# Patient Record
Sex: FEMALE | Race: WHITE | Employment: FULL TIME | URBAN - METROPOLITAN AREA
[De-identification: names, ages, dates, MRNs, and addresses within clinical notes are randomized per-mention and may not be internally consistent; named-entity substitution may affect disease eponyms.]

---

## 2017-06-05 ENCOUNTER — OFFICE VISIT (OUTPATIENT)
Dept: OBGYN | Age: 32
End: 2017-06-05
Payer: COMMERCIAL

## 2017-06-05 ENCOUNTER — HOSPITAL ENCOUNTER (OUTPATIENT)
Age: 32
Setting detail: SPECIMEN
Discharge: HOME | End: 2017-06-05
Payer: COMMERCIAL

## 2017-06-05 VITALS
HEIGHT: 69 IN | HEART RATE: 66 BPM | DIASTOLIC BLOOD PRESSURE: 70 MMHG | RESPIRATION RATE: 18 BRPM | BODY MASS INDEX: 35.47 KG/M2 | WEIGHT: 239.5 LBS | SYSTOLIC BLOOD PRESSURE: 117 MMHG | TEMPERATURE: 97.9 F

## 2017-06-05 DIAGNOSIS — N87.0 DYSPLASIA OF CERVIX, LOW GRADE (CIN 1): Primary | ICD-10-CM

## 2017-06-05 DIAGNOSIS — R87.810 ASCUS WITH POSITIVE HIGH RISK HPV CERVICAL: ICD-10-CM

## 2017-06-05 DIAGNOSIS — R87.610 ASCUS WITH POSITIVE HIGH RISK HPV CERVICAL: ICD-10-CM

## 2017-06-05 LAB
CONTROL: NORMAL
PREGNANCY TEST URINE, POC: NEGATIVE

## 2017-06-05 PROCEDURE — 81025 URINE PREGNANCY TEST: CPT | Performed by: OBSTETRICS & GYNECOLOGY

## 2017-06-05 PROCEDURE — 57454 BX/CURETT OF CERVIX W/SCOPE: CPT | Performed by: OBSTETRICS & GYNECOLOGY

## 2017-06-08 LAB — SURGICAL PATHOLOGY REPORT: NORMAL

## 2017-06-19 ENCOUNTER — HOSPITAL ENCOUNTER (OUTPATIENT)
Age: 32
Setting detail: SPECIMEN
Discharge: HOME | End: 2017-06-19
Payer: COMMERCIAL

## 2017-06-19 ENCOUNTER — OFFICE VISIT (OUTPATIENT)
Dept: OBGYN | Age: 32
End: 2017-06-19
Payer: COMMERCIAL

## 2017-06-19 VITALS
WEIGHT: 240.3 LBS | DIASTOLIC BLOOD PRESSURE: 88 MMHG | HEART RATE: 59 BPM | TEMPERATURE: 98.5 F | RESPIRATION RATE: 18 BRPM | SYSTOLIC BLOOD PRESSURE: 130 MMHG | BODY MASS INDEX: 35.59 KG/M2 | HEIGHT: 69 IN

## 2017-06-19 DIAGNOSIS — Z01.419 WELL WOMAN EXAM WITH ROUTINE GYNECOLOGICAL EXAM: Primary | ICD-10-CM

## 2017-06-19 DIAGNOSIS — R87.810 ASCUS WITH POSITIVE HIGH RISK HPV CERVICAL: ICD-10-CM

## 2017-06-19 DIAGNOSIS — R87.610 ASCUS WITH POSITIVE HIGH RISK HPV CERVICAL: ICD-10-CM

## 2017-06-19 PROCEDURE — 99395 PREV VISIT EST AGE 18-39: CPT | Performed by: OBSTETRICS & GYNECOLOGY

## 2017-06-19 RX ORDER — NORGESTIMATE AND ETHINYL ESTRADIOL 7DAYSX3 28
1 KIT ORAL DAILY
Qty: 28 TABLET | Refills: 3 | Status: SHIPPED | OUTPATIENT
Start: 2017-06-19 | End: 2017-12-28 | Stop reason: ALTCHOICE

## 2017-06-19 ASSESSMENT — PATIENT HEALTH QUESTIONNAIRE - PHQ9
SUM OF ALL RESPONSES TO PHQ9 QUESTIONS 1 & 2: 0
1. LITTLE INTEREST OR PLEASURE IN DOING THINGS: 0
2. FEELING DOWN, DEPRESSED OR HOPELESS: 0
SUM OF ALL RESPONSES TO PHQ QUESTIONS 1-9: 0

## 2017-06-20 LAB
CYTOLOGY REPORT: NORMAL
HPV SAMPLE: ABNORMAL
HPV SOURCE: ABNORMAL
HPV, GENOTYPE 16: NOT DETECTED
HPV, GENOTYPE 18: NOT DETECTED
HPV, HIGH RISK OTHER: DETECTED
HPV, INTERPRETATION: ABNORMAL

## 2017-11-09 ENCOUNTER — TELEPHONE (OUTPATIENT)
Dept: ADMINISTRATIVE | Age: 32
End: 2017-11-09

## 2017-11-14 ENCOUNTER — OFFICE VISIT (OUTPATIENT)
Dept: OBGYN | Age: 32
End: 2017-11-14
Payer: COMMERCIAL

## 2017-11-14 VITALS
BODY MASS INDEX: 35.88 KG/M2 | WEIGHT: 243 LBS | SYSTOLIC BLOOD PRESSURE: 123 MMHG | HEART RATE: 48 BPM | DIASTOLIC BLOOD PRESSURE: 75 MMHG

## 2017-11-14 DIAGNOSIS — N87.0 CIN I (CERVICAL INTRAEPITHELIAL NEOPLASIA I): Primary | ICD-10-CM

## 2017-11-14 PROCEDURE — 1036F TOBACCO NON-USER: CPT | Performed by: OBSTETRICS & GYNECOLOGY

## 2017-11-14 PROCEDURE — G8417 CALC BMI ABV UP PARAM F/U: HCPCS | Performed by: OBSTETRICS & GYNECOLOGY

## 2017-11-14 PROCEDURE — G8427 DOCREV CUR MEDS BY ELIG CLIN: HCPCS | Performed by: OBSTETRICS & GYNECOLOGY

## 2017-11-14 PROCEDURE — G8484 FLU IMMUNIZE NO ADMIN: HCPCS | Performed by: OBSTETRICS & GYNECOLOGY

## 2017-11-14 PROCEDURE — 99213 OFFICE O/P EST LOW 20 MIN: CPT | Performed by: OBSTETRICS & GYNECOLOGY

## 2017-11-14 NOTE — PROGRESS NOTES
Sammi Charles  2017      Sammi Charles is a 34 y.o. female     The patient was seen today. She was here to follow-up regarding her labs and diagnostics ordered at her last visit for the diagnosis of:    ICD-10-CM ICD-9-CM    1. KRANTHI I (cervical intraepithelial neoplasia I) N87.0 622.11        Patient had a colposcopy performed in 2017 that revealed KRANTHI 1. Patient has a significant history of LGSIL that has been persistent for greater than 24 months. Patient was previously undecided if she wishes to proceed with recommended excision procedure to elect for expectant management with follow up pap smear with cotesting in 12 months. Management options again discussed with patient, risks/benefits/alternatives to excision procedure reveiwed with patient. Patient states she would like to proceed with LEEP. She does not have any specific chief complaint today. Her bowels are regular and she is voiding without difficulty.        Past Medical History:   Diagnosis Date    Abnormal Pap smear     HSIL    Bartholin cyst 2007 word catheter    HSV (herpes simplex virus) infection 2013         Past Surgical History:   Procedure Laterality Date    BUNIONECTOMY           Family History   Problem Relation Age of Onset    Breast Cancer Maternal Grandmother 54    Cancer Neg Hx     Colon Cancer Neg Hx     Diabetes Neg Hx     Eclampsia Neg Hx     Hypertension Neg Hx     Ovarian Cancer Neg Hx      Labor Neg Hx     Spont Abortions Neg Hx     Stroke Neg Hx          Social History   Substance Use Topics    Smoking status: Never Smoker    Smokeless tobacco: Never Used    Alcohol use Yes      Comment: once weekly         MEDICATIONS:  Current Outpatient Prescriptions   Medication Sig Dispense Refill    Norgestim-Eth Estrad Triphasic (ORTHO TRI-CYCLEN, 28,) 0.18/0.215/0.25 MG-35 MCG TABS Take 1 tablet by mouth daily 28 tablet 3    lidocaine (XYLOCAINE) 5 % ointment Apply present. Descriptive Diagnosis:      Atypical squamous cells of undetermined significance (ASC-US). Comments:      High Risk HPV testing was ordered. Cytotechnologist:   SANDRO Cardoza M.D.  **Electronically Signed Out**         sls/6/20/2017          Procedure/Addendum  HPV Procedure Report     Date Ordered:     6/20/2017     Status:  Signed Out      Date Complete:     6/20/2017     By: Verner Adu, CT(ASCP)      Date Reported:     6/20/2017       INTERPRETATION  Roche HPV DNA High Risk                                                      HPV Sample               Thin Prep                    (Ref Range)  HPV Type 16               Not Detected                    (Not  Detected)  HPV Type 18               Not Detected                    (Not  Detected)  Other High Risk HPV     Detected                    (Not Detected)      This test amplifies and detects DNA of 14 high-risk HPV types  associated with cervical cancer and its precursor lesions (HPV types  16, 18, 31, 33, 35, 39, 45, 51, 52, 56, 58, 59, 66, and 68). Sensitivity may be affected by specimen collection methods, stage of  infection, and the presence of interfe ring substances. Results should  be interpreted in conjunction with other available laboratory and  clinical data. A negative high-risk HPV result does not exclude the  possibility of future cytologic HSIL or underlying CIN2-3 or cancer. This test is intended for medical purposes only and is not valid for  the evaluation of suspected sexual abuse or for other forensic  purposes. Assessment:  1.  KRANTHI I (cervical intraepithelial neoplasia I)       Chief Complaint   Patient presents with    Abnormal Pap Smear       Patient Active Problem List    Diagnosis Date Noted    Dysplasia of cervix, low grade (KRANTHI 1) 06/01/2015     Priority: High     Co-testing recommended in 1 year per ASCCP guidelines (due 5/2016)      Family planning 01/19/2015     Priority: Medium

## 2017-12-11 ENCOUNTER — OFFICE VISIT (OUTPATIENT)
Dept: OBGYN | Age: 32
End: 2017-12-11
Payer: COMMERCIAL

## 2017-12-11 VITALS
SYSTOLIC BLOOD PRESSURE: 117 MMHG | DIASTOLIC BLOOD PRESSURE: 72 MMHG | BODY MASS INDEX: 36.07 KG/M2 | WEIGHT: 243.5 LBS | TEMPERATURE: 98.9 F | RESPIRATION RATE: 18 BRPM | HEART RATE: 63 BPM | HEIGHT: 69 IN

## 2017-12-11 DIAGNOSIS — Z01.818 PREOP EXAMINATION: Primary | ICD-10-CM

## 2017-12-11 DIAGNOSIS — N87.0 DYSPLASIA OF CERVIX, LOW GRADE (CIN 1): ICD-10-CM

## 2017-12-11 PROCEDURE — 99213 OFFICE O/P EST LOW 20 MIN: CPT | Performed by: OBSTETRICS & GYNECOLOGY

## 2017-12-11 PROCEDURE — G8427 DOCREV CUR MEDS BY ELIG CLIN: HCPCS | Performed by: OBSTETRICS & GYNECOLOGY

## 2017-12-11 PROCEDURE — G8417 CALC BMI ABV UP PARAM F/U: HCPCS | Performed by: OBSTETRICS & GYNECOLOGY

## 2017-12-11 PROCEDURE — 1036F TOBACCO NON-USER: CPT | Performed by: OBSTETRICS & GYNECOLOGY

## 2017-12-11 PROCEDURE — G8484 FLU IMMUNIZE NO ADMIN: HCPCS | Performed by: OBSTETRICS & GYNECOLOGY

## 2017-12-11 RX ORDER — SULFAMETHOXAZOLE AND TRIMETHOPRIM 800; 160 MG/1; MG/1
TABLET ORAL
COMMUNITY
Start: 2017-12-07 | End: 2017-12-28 | Stop reason: ALTCHOICE

## 2017-12-11 RX ORDER — CEPHALEXIN 500 MG/1
500 CAPSULE ORAL
COMMUNITY
Start: 2017-12-07 | End: 2017-12-17

## 2017-12-11 NOTE — PROGRESS NOTES
Anderson Santo  1985  Primary Care Physician: . None (Inactive)    HISTORY & PHYSICAL      2017       HOSPITAL: [facility]     HPI: Anderson Santo is a 34 y.o. female [id number] who presents for her pre-operative examination for a LEEP. Patient had a colposcopy performed in 2017 that revealed KRANTHI 1. Patient has a significant history of LGSIL that has been persistent for greater than 24 months and she would like to proceed with an excisional procedure. Management options again discussed with patient, risks/benefits/alternatives to an excisional procedure reveiwed with patient. Patient was counseled on risk for  delivery and incompetent cervix with pregnancies following a LEEP. Patient states she has completed her child bearing. All questions were answered and informed consent was obtained and signed. 1. Preop examination    2. KRANTHI 1       Patient Active Problem List   Diagnosis    HSV (herpes simplex virus) infection    Family planning    ASCUS with positive high risk HPV cervical    KRANTHI 1       Obstetric History       T1      L1     SAB0   TAB0   Ectopic0   Molar0   Multiple0   Live Births0       # Outcome Date GA Lbr Fortino/2nd Weight Sex Delivery Anes PTL Lv   2 SAB            1 Term                 Past Medical History:   Diagnosis Date    Abnormal Pap smear     HSIL    Bartholin cyst , 2007 word catheter    HSV (herpes simplex virus) infection 2013       Past Surgical History:   Procedure Laterality Date    BUNIONECTOMY         Social History     Social History    Marital status:      Spouse name: N/A    Number of children: N/A    Years of education: N/A     Occupational History    Not on file.      Social History Main Topics    Smoking status: Never Smoker    Smokeless tobacco: Never Used    Alcohol use Yes      Comment: once weekly    Drug use: No    Sexual activity: Yes     Partners: Male     Other Topics Concern Cytotechnologist:   SANDRO Perez M.D.  **Electronically Signed Out**         sls/6/20/2017          Procedure/Addendum  HPV Procedure Report     Date Ordered:     6/20/2017     Status:  Signed Out      Date Complete:     6/20/2017     By: WERNER Viera(ASCP)      Date Reported:     6/20/2017       INTERPRETATION  Roche HPV DNA High Risk                                                      HPV Sample               Thin Prep                    (Ref Range)  HPV Type 16               Not Detected                    (Not  Detected)  HPV Type 18               Not Detected                    (Not  Detected)  Other High Risk HPV     Detected                    (Not Detected)      This test amplifies and detects DNA of 14 high-risk HPV types  associated with cervical cancer and its precursor lesions (HPV types  16, 18, 31, 33, 35, 39, 45, 51, 52, 56, 58, 59, 66, and 68). Sensitivity may be affected by specimen collection methods, stage of  infection, and the presence of interfe ring substances. Results should  be interpreted in conjunction with other available laboratory and  clinical data. A negative high-risk HPV result does not exclude the  possibility of future cytologic HSIL or underlying CIN2-3 or cancer. This test is intended for medical purposes only and is not valid for  the evaluation of suspected sexual abuse or for other forensic  purposes. ASSESSMENT:    1. Preop examination     2. KRANTHI 1         Patient Active Problem List    Diagnosis Date Noted    KRANTHI 1 12/11/2017     Priority: High     Patient to be scheduled for LEEP       ASCUS with positive high risk HPV cervical 06/14/2016     Priority: High     6/5/17: Colposcopy performed.  ECC negative, 12 o'clock biopsy KRANTHI 1  Pap smear collceted 6/19/17 - will contact patient with results (previously counseled on LEEP secondary to persistent KRANTHI 1 for 24 months vs repeat pap in 12 months - patient undecided at this time)  Family planning 01/19/2015     Priority: Medium     1/19/15:  Ortho Tricyclin Low prescription given. Pt previously was on and worked well for her. Pt denies any use of tobacco products, no history of migraines or DVT. 12/11/17: Not currently taking.  HSV (herpes simplex virus) infection 02/11/2013     Priority: Low     No recent outbreak. No medication needed. Plan: Will proceed with LEEP  Informed consent obtained and signed   Will scheduled patient for Pre-admission testing  Plan for 2 week post operative follow up         Counseling: The patient was counseled on all options both medical and surgical, conservative as well as definitive. She has elected to proceed with the procedure as stated above. The patient was counseled on the procedure. Risks and complications were reviewed in detail. The patients orders, labs, consents have been completed. The history and physical as well as all supporting surgical documentation will be forwarded to the pre-operative holding area. The patient is aware that this procedure may not alleviate her symptoms. That there may be a necessity for a second surgery and that there may be an incomplete removal of abnormal tissue. The patients that are undergoing a procedure for family planning WERE INSTRUCTED THAT THE PROCEDURE IS PERMANENT AND NON REVERSIBLE. FAILURE RATES OF 18-20/1000 CASES WERE REVIEWED AS WELL AS ALL ALTERNATE REVERSIBLE FORMS OF BIRTH CONTROL MALE AND FEMALE. THEY WERE COUNSELED THAT A FAILURE WOULD MOST LIKELY RESULT IN AN ECTOPIC PREGNANCY, A PREGNANCY OUTSIDE OF THE UTERUS MOST COMMONLY IN THE FALLOPIAN TUBE, NECESSITATING FURTHER SURGERY, A BLOOD TRANSFUSION OR BOTH. POST TUBAL STERILIZATION SYNDROME WAS ALSO DISCUSSED WITH THE PATIENT AT LENGTH.      ________________________________________D.O./M.D. Date:___________  Bita Godinez DO        Patient was seen with total face to face time of 15 minutes.  More than 50% of this visit was on counseling and education regarding her   1. Preop examination     2. KRANTHI 1      and her options. She was also counseled on her preventative health maintenance recommendations and follow-up.

## 2017-12-13 ENCOUNTER — TELEPHONE (OUTPATIENT)
Dept: OBGYN | Age: 32
End: 2017-12-13

## 2017-12-13 DIAGNOSIS — Z01.818 PREOP TESTING: Primary | ICD-10-CM

## 2017-12-13 NOTE — TELEPHONE ENCOUNTER
12/13/17 Dr. Viji Dillard, pt surgery has been scheduled for 1/15/18 @ 7:30am and PAT testing on 12/28/17 @8:30am pt has been made aware of scheduled surgery/PAT pt verbalized understanding.

## 2017-12-28 ENCOUNTER — HOSPITAL ENCOUNTER (OUTPATIENT)
Dept: PREADMISSION TESTING | Age: 32
Discharge: HOME | End: 2017-12-28
Payer: COMMERCIAL

## 2017-12-28 VITALS
HEART RATE: 64 BPM | TEMPERATURE: 98 F | OXYGEN SATURATION: 98 % | BODY MASS INDEX: 36.57 KG/M2 | RESPIRATION RATE: 16 BRPM | DIASTOLIC BLOOD PRESSURE: 84 MMHG | WEIGHT: 246.91 LBS | SYSTOLIC BLOOD PRESSURE: 133 MMHG | HEIGHT: 69 IN

## 2017-12-28 RX ORDER — SODIUM CHLORIDE, SODIUM LACTATE, POTASSIUM CHLORIDE, CALCIUM CHLORIDE 600; 310; 30; 20 MG/100ML; MG/100ML; MG/100ML; MG/100ML
1000 INJECTION, SOLUTION INTRAVENOUS CONTINUOUS
Status: CANCELLED | OUTPATIENT
Start: 2017-12-28

## 2018-01-12 ENCOUNTER — ANESTHESIA EVENT (OUTPATIENT)
Dept: OPERATING ROOM | Age: 33
End: 2018-01-12
Payer: COMMERCIAL

## 2018-01-15 ENCOUNTER — HOSPITAL ENCOUNTER (OUTPATIENT)
Age: 33
Setting detail: OUTPATIENT SURGERY
Discharge: HOME | End: 2018-01-15
Attending: OBSTETRICS & GYNECOLOGY | Admitting: OBSTETRICS & GYNECOLOGY
Payer: COMMERCIAL

## 2018-01-15 ENCOUNTER — ANESTHESIA (OUTPATIENT)
Dept: OPERATING ROOM | Age: 33
End: 2018-01-15
Payer: COMMERCIAL

## 2018-01-15 VITALS
HEIGHT: 69 IN | OXYGEN SATURATION: 100 % | SYSTOLIC BLOOD PRESSURE: 115 MMHG | RESPIRATION RATE: 16 BRPM | BODY MASS INDEX: 36.43 KG/M2 | DIASTOLIC BLOOD PRESSURE: 75 MMHG | HEART RATE: 51 BPM | WEIGHT: 246 LBS | TEMPERATURE: 97.2 F

## 2018-01-15 VITALS
OXYGEN SATURATION: 99 % | RESPIRATION RATE: 17 BRPM | DIASTOLIC BLOOD PRESSURE: 36 MMHG | TEMPERATURE: 96 F | SYSTOLIC BLOOD PRESSURE: 89 MMHG

## 2018-01-15 PROBLEM — Z98.890 S/P LEEP: Status: ACTIVE | Noted: 2018-01-15

## 2018-01-15 LAB — HCG, PREGNANCY URINE (POC): NEGATIVE

## 2018-01-15 PROCEDURE — 3700000001 HC ADD 15 MINUTES (ANESTHESIA): Performed by: OBSTETRICS & GYNECOLOGY

## 2018-01-15 PROCEDURE — 88305 TISSUE EXAM BY PATHOLOGIST: CPT

## 2018-01-15 PROCEDURE — 84703 CHORIONIC GONADOTROPIN ASSAY: CPT

## 2018-01-15 PROCEDURE — 6360000002 HC RX W HCPCS: Performed by: SPECIALIST

## 2018-01-15 PROCEDURE — 7100000000 HC PACU RECOVERY - FIRST 15 MIN: Performed by: OBSTETRICS & GYNECOLOGY

## 2018-01-15 PROCEDURE — 88307 TISSUE EXAM BY PATHOLOGIST: CPT

## 2018-01-15 PROCEDURE — 2580000003 HC RX 258: Performed by: OBSTETRICS & GYNECOLOGY

## 2018-01-15 PROCEDURE — 7100000041 HC SPAR PHASE II RECOVERY - ADDTL 15 MIN: Performed by: OBSTETRICS & GYNECOLOGY

## 2018-01-15 PROCEDURE — 3700000000 HC ANESTHESIA ATTENDED CARE: Performed by: OBSTETRICS & GYNECOLOGY

## 2018-01-15 PROCEDURE — 6370000000 HC RX 637 (ALT 250 FOR IP)

## 2018-01-15 PROCEDURE — 2580000003 HC RX 258: Performed by: ANESTHESIOLOGY

## 2018-01-15 PROCEDURE — 7100000040 HC SPAR PHASE II RECOVERY - FIRST 15 MIN: Performed by: OBSTETRICS & GYNECOLOGY

## 2018-01-15 PROCEDURE — 7100000001 HC PACU RECOVERY - ADDTL 15 MIN: Performed by: OBSTETRICS & GYNECOLOGY

## 2018-01-15 PROCEDURE — 2500000003 HC RX 250 WO HCPCS: Performed by: OBSTETRICS & GYNECOLOGY

## 2018-01-15 PROCEDURE — 6370000000 HC RX 637 (ALT 250 FOR IP): Performed by: OBSTETRICS & GYNECOLOGY

## 2018-01-15 PROCEDURE — 2500000003 HC RX 250 WO HCPCS: Performed by: SPECIALIST

## 2018-01-15 PROCEDURE — 3600000003 HC SURGERY LEVEL 3 BASE: Performed by: OBSTETRICS & GYNECOLOGY

## 2018-01-15 PROCEDURE — 3600000013 HC SURGERY LEVEL 3 ADDTL 15MIN: Performed by: OBSTETRICS & GYNECOLOGY

## 2018-01-15 PROCEDURE — 57522 CONIZATION OF CERVIX: CPT | Performed by: OBSTETRICS & GYNECOLOGY

## 2018-01-15 RX ORDER — IBUPROFEN 800 MG/1
800 TABLET ORAL EVERY 8 HOURS PRN
Qty: 30 TABLET | Refills: 0 | Status: SHIPPED | OUTPATIENT
Start: 2018-01-15 | End: 2022-02-01

## 2018-01-15 RX ORDER — ULTRASOUND COUPLING MEDIUM
GEL (GRAM) TOPICAL PRN
Status: DISCONTINUED | OUTPATIENT
Start: 2018-01-15 | End: 2018-01-15 | Stop reason: HOSPADM

## 2018-01-15 RX ORDER — ONDANSETRON 2 MG/ML
INJECTION INTRAMUSCULAR; INTRAVENOUS PRN
Status: DISCONTINUED | OUTPATIENT
Start: 2018-01-15 | End: 2018-01-15 | Stop reason: SDUPTHER

## 2018-01-15 RX ORDER — LABETALOL HYDROCHLORIDE 5 MG/ML
5 INJECTION, SOLUTION INTRAVENOUS EVERY 10 MIN PRN
Status: DISCONTINUED | OUTPATIENT
Start: 2018-01-15 | End: 2018-01-15 | Stop reason: HOSPADM

## 2018-01-15 RX ORDER — SODIUM CHLORIDE, SODIUM LACTATE, POTASSIUM CHLORIDE, CALCIUM CHLORIDE 600; 310; 30; 20 MG/100ML; MG/100ML; MG/100ML; MG/100ML
INJECTION, SOLUTION INTRAVENOUS CONTINUOUS
Status: DISCONTINUED | OUTPATIENT
Start: 2018-01-15 | End: 2018-01-15 | Stop reason: HOSPADM

## 2018-01-15 RX ORDER — MEPERIDINE HYDROCHLORIDE 50 MG/ML
12.5 INJECTION INTRAMUSCULAR; INTRAVENOUS; SUBCUTANEOUS EVERY 5 MIN PRN
Status: DISCONTINUED | OUTPATIENT
Start: 2018-01-15 | End: 2018-01-15 | Stop reason: HOSPADM

## 2018-01-15 RX ORDER — LIDOCAINE HYDROCHLORIDE 10 MG/ML
INJECTION, SOLUTION EPIDURAL; INFILTRATION; INTRACAUDAL; PERINEURAL PRN
Status: DISCONTINUED | OUTPATIENT
Start: 2018-01-15 | End: 2018-01-15 | Stop reason: SDUPTHER

## 2018-01-15 RX ORDER — ONDANSETRON 2 MG/ML
4 INJECTION INTRAMUSCULAR; INTRAVENOUS
Status: DISCONTINUED | OUTPATIENT
Start: 2018-01-15 | End: 2018-01-15 | Stop reason: HOSPADM

## 2018-01-15 RX ORDER — FENTANYL CITRATE 50 UG/ML
INJECTION, SOLUTION INTRAMUSCULAR; INTRAVENOUS PRN
Status: DISCONTINUED | OUTPATIENT
Start: 2018-01-15 | End: 2018-01-15 | Stop reason: SDUPTHER

## 2018-01-15 RX ORDER — PYRANTEL PAMOATE 100 %
POWDER (GRAM) MISCELLANEOUS PRN
Status: DISCONTINUED | OUTPATIENT
Start: 2018-01-15 | End: 2018-01-15 | Stop reason: HOSPADM

## 2018-01-15 RX ORDER — MAGNESIUM HYDROXIDE 1200 MG/15ML
LIQUID ORAL CONTINUOUS PRN
Status: DISCONTINUED | OUTPATIENT
Start: 2018-01-15 | End: 2018-01-15 | Stop reason: HOSPADM

## 2018-01-15 RX ORDER — DIPHENHYDRAMINE HYDROCHLORIDE 50 MG/ML
12.5 INJECTION INTRAMUSCULAR; INTRAVENOUS
Status: DISCONTINUED | OUTPATIENT
Start: 2018-01-15 | End: 2018-01-15 | Stop reason: HOSPADM

## 2018-01-15 RX ORDER — SODIUM CHLORIDE, SODIUM LACTATE, POTASSIUM CHLORIDE, CALCIUM CHLORIDE 600; 310; 30; 20 MG/100ML; MG/100ML; MG/100ML; MG/100ML
1000 INJECTION, SOLUTION INTRAVENOUS CONTINUOUS
Status: DISCONTINUED | OUTPATIENT
Start: 2018-01-15 | End: 2018-01-15

## 2018-01-15 RX ORDER — OXYCODONE HYDROCHLORIDE AND ACETAMINOPHEN 5; 325 MG/1; MG/1
1 TABLET ORAL PRN
Status: DISCONTINUED | OUTPATIENT
Start: 2018-01-15 | End: 2018-01-15 | Stop reason: HOSPADM

## 2018-01-15 RX ORDER — OXYCODONE HYDROCHLORIDE AND ACETAMINOPHEN 5; 325 MG/1; MG/1
2 TABLET ORAL PRN
Status: DISCONTINUED | OUTPATIENT
Start: 2018-01-15 | End: 2018-01-15 | Stop reason: HOSPADM

## 2018-01-15 RX ORDER — FERRIC SUBSULFATE 0.21 G/G
LIQUID TOPICAL PRN
Status: DISCONTINUED | OUTPATIENT
Start: 2018-01-15 | End: 2018-01-15 | Stop reason: HOSPADM

## 2018-01-15 RX ORDER — PROPOFOL 10 MG/ML
INJECTION, EMULSION INTRAVENOUS PRN
Status: DISCONTINUED | OUTPATIENT
Start: 2018-01-15 | End: 2018-01-15 | Stop reason: SDUPTHER

## 2018-01-15 RX ORDER — FENTANYL CITRATE 50 UG/ML
25 INJECTION, SOLUTION INTRAMUSCULAR; INTRAVENOUS EVERY 5 MIN PRN
Status: DISCONTINUED | OUTPATIENT
Start: 2018-01-15 | End: 2018-01-15 | Stop reason: HOSPADM

## 2018-01-15 RX ORDER — HYDRALAZINE HYDROCHLORIDE 20 MG/ML
5 INJECTION INTRAMUSCULAR; INTRAVENOUS EVERY 10 MIN PRN
Status: DISCONTINUED | OUTPATIENT
Start: 2018-01-15 | End: 2018-01-15 | Stop reason: HOSPADM

## 2018-01-15 RX ORDER — FENTANYL CITRATE 50 UG/ML
50 INJECTION, SOLUTION INTRAMUSCULAR; INTRAVENOUS EVERY 5 MIN PRN
Status: DISCONTINUED | OUTPATIENT
Start: 2018-01-15 | End: 2018-01-15 | Stop reason: HOSPADM

## 2018-01-15 RX ORDER — MORPHINE SULFATE 2 MG/ML
2 INJECTION, SOLUTION INTRAMUSCULAR; INTRAVENOUS EVERY 5 MIN PRN
Status: DISCONTINUED | OUTPATIENT
Start: 2018-01-15 | End: 2018-01-15 | Stop reason: HOSPADM

## 2018-01-15 RX ORDER — LIDOCAINE HYDROCHLORIDE AND EPINEPHRINE 20; 5 MG/ML; UG/ML
INJECTION, SOLUTION EPIDURAL; INFILTRATION; INTRACAUDAL; PERINEURAL PRN
Status: DISCONTINUED | OUTPATIENT
Start: 2018-01-15 | End: 2018-01-15 | Stop reason: HOSPADM

## 2018-01-15 RX ADMIN — PROPOFOL 200 MG: 10 INJECTION, EMULSION INTRAVENOUS at 07:45

## 2018-01-15 RX ADMIN — SODIUM CHLORIDE, POTASSIUM CHLORIDE, SODIUM LACTATE AND CALCIUM CHLORIDE: 600; 310; 30; 20 INJECTION, SOLUTION INTRAVENOUS at 06:35

## 2018-01-15 RX ADMIN — LIDOCAINE HYDROCHLORIDE 50 MG: 10 INJECTION, SOLUTION EPIDURAL; INFILTRATION; INTRACAUDAL; PERINEURAL at 07:45

## 2018-01-15 RX ADMIN — FENTANYL CITRATE 25 MCG: 50 INJECTION INTRAMUSCULAR; INTRAVENOUS at 07:45

## 2018-01-15 RX ADMIN — ONDANSETRON 4 MG: 2 INJECTION INTRAMUSCULAR; INTRAVENOUS at 07:55

## 2018-01-15 ASSESSMENT — PAIN SCALES - GENERAL
PAINLEVEL_OUTOF10: 0
PAINLEVEL_OUTOF10: 0
PAINLEVEL_OUTOF10: 2
PAINLEVEL_OUTOF10: 0
PAINLEVEL_OUTOF10: 1
PAINLEVEL_OUTOF10: 1

## 2018-01-15 ASSESSMENT — PULMONARY FUNCTION TESTS
PIF_VALUE: 11
PIF_VALUE: 18
PIF_VALUE: 11
PIF_VALUE: 1
PIF_VALUE: 11
PIF_VALUE: 11
PIF_VALUE: 18
PIF_VALUE: 11
PIF_VALUE: 11
PIF_VALUE: 1
PIF_VALUE: 11
PIF_VALUE: 19
PIF_VALUE: 11
PIF_VALUE: 19
PIF_VALUE: 10
PIF_VALUE: 11
PIF_VALUE: 1
PIF_VALUE: 11
PIF_VALUE: 22
PIF_VALUE: 11
PIF_VALUE: 11
PIF_VALUE: 13
PIF_VALUE: 11
PIF_VALUE: 6
PIF_VALUE: 11
PIF_VALUE: 17
PIF_VALUE: 11
PIF_VALUE: 13
PIF_VALUE: 11
PIF_VALUE: 11
PIF_VALUE: 3
PIF_VALUE: 10
PIF_VALUE: 11
PIF_VALUE: 13
PIF_VALUE: 11
PIF_VALUE: 11
PIF_VALUE: 10
PIF_VALUE: 1
PIF_VALUE: 10
PIF_VALUE: 5
PIF_VALUE: 11
PIF_VALUE: 11

## 2018-01-15 ASSESSMENT — PAIN DESCRIPTION - PAIN TYPE: TYPE: ACUTE PAIN

## 2018-01-15 ASSESSMENT — PAIN DESCRIPTION - LOCATION: LOCATION: HEAD

## 2018-01-15 ASSESSMENT — PAIN - FUNCTIONAL ASSESSMENT: PAIN_FUNCTIONAL_ASSESSMENT: 0-10

## 2018-01-15 NOTE — OP NOTE
Operative Note  Department of Obstetrics and Gynecology  [address] [facility]       Patient: Faina Rapp   : 1985  MRN: [id number]       Acct: [de-identified]   PCP: . None (Inactive)  Date of Procedure: 1/15/18    Pre-operative Diagnosis:  28 y.o. female   KRANTHI 1 present for more than 24 months    Post-operative Diagnosis:   28 y.o. female   KRANTHI 1 present for more than 24 months    Procedure: LEEP with endocervical curettings    Surgeon: Dr. Jayla Treviño MD  Assistants: Aman Durand DO; Salome Aparicio MD    Anesthesia: general    Indications: KRANTHI I present for more than 24 months (patient preferred surgical management rather than conservative management at this time)    Procedure Details: The patient was seen in the pre-op room. The risks, benefits, complications, treatment options, and expected outcomes were discussed with the patient. The patient concurred with the proposed plan, giving informed consent. The patient was taken to the Operating Room and identified as Faina Rapp and the procedure was verified. A Time Out was held and the above information confirmed. After administration of general anesthesia, the patient was placed in the dorsolithotomy position utilizing candy cane stirrups. The patient was prepped and draped in the usual sterile fashion. The bladder was emptied prior to the procedure, insulated speculum was placed into the vagina to visualize the cervix. Lugol's Iodine was applied to the cervix revealing  Visible uptake of solution along transformation zone. The cervix was then injected with 4ml 1% lidocaine with epinephrine at 2, 4, 8 and 10 o'clock. The endocervical canal orientation was identified. Using 20mm x 12mm loop an approximately 9mm cone biopsy of the posterior cervix with canal was obtained and the procedure was repeated torstenni in a similar fashion to obtain an anterior cervical portion.  Then, upper endocervical curettage was carried

## 2018-01-15 NOTE — ANESTHESIA POSTPROCEDURE EVALUATION
Department of Anesthesiology  Postprocedure Note    Patient: Leisa Zee  MRN: [id number]  YOB: 1985  Date of evaluation: 1/15/2018  Time:  11:32 AM     Procedure Summary     Date:  01/15/18 Room / Location:  [facility] OR [address] [place] [address] OR    Anesthesia Start:  4371 Anesthesia Stop:  Lupe Schirmer    Procedure:  LEEP (N/A ) Diagnosis:  (CERVICAL DYSPLASIA,   KRANTHI-1)    Surgeon:  James Mendez MD Responsible Provider:  Issac Malagon MD    Anesthesia Type:  general, MAC ASA Status:  1          Anesthesia Type: general, MAC    Alla Phase I: Alla Score: 10    Alla Phase II: Alla Score: 10    Last vitals: Reviewed and per EMR flowsheets.        Anesthesia Post Evaluation    Patient location during evaluation: PACU  Patient participation: complete - patient participated  Level of consciousness: awake  Pain score: 1  Airway patency: patent  Nausea & Vomiting: no vomiting  Complications: no  Cardiovascular status: hemodynamically stable  Respiratory status: acceptable  Hydration status: stable

## 2018-01-15 NOTE — PROGRESS NOTES
The patient was seen in the preoperative holding area. There have been no changes in her condition since completion of the preoperative H&P. I have personally explained the planned procedure to her. LEEP is being performed due to persistence of KRANTHI 1 for more than 24 months. The indication, risks and alternatives were reviewed. All of her questions were answered to the best of my ability. Ok to proceed to the OR.

## 2018-01-16 LAB — SURGICAL PATHOLOGY REPORT: NORMAL

## 2018-01-29 ENCOUNTER — OFFICE VISIT (OUTPATIENT)
Dept: OBGYN | Age: 33
End: 2018-01-29
Payer: COMMERCIAL

## 2018-01-29 VITALS
HEART RATE: 68 BPM | TEMPERATURE: 98.2 F | WEIGHT: 245.3 LBS | BODY MASS INDEX: 36.33 KG/M2 | DIASTOLIC BLOOD PRESSURE: 68 MMHG | HEIGHT: 69 IN | SYSTOLIC BLOOD PRESSURE: 120 MMHG

## 2018-01-29 DIAGNOSIS — N87.0 DYSPLASIA OF CERVIX, LOW GRADE (CIN 1): Primary | ICD-10-CM

## 2018-01-29 DIAGNOSIS — Z98.890 S/P LEEP: ICD-10-CM

## 2018-01-29 PROCEDURE — 99024 POSTOP FOLLOW-UP VISIT: CPT | Performed by: STUDENT IN AN ORGANIZED HEALTH CARE EDUCATION/TRAINING PROGRAM

## 2018-05-24 ENCOUNTER — TELEPHONE (OUTPATIENT)
Dept: OBGYN | Age: 33
End: 2018-05-24

## 2019-03-04 ENCOUNTER — OFFICE VISIT (OUTPATIENT)
Dept: OBGYN | Age: 34
End: 2019-03-04
Payer: COMMERCIAL

## 2019-03-04 ENCOUNTER — HOSPITAL ENCOUNTER (OUTPATIENT)
Age: 34
Setting detail: SPECIMEN
Discharge: HOME | End: 2019-03-04
Payer: COMMERCIAL

## 2019-03-04 VITALS
WEIGHT: 247 LBS | HEART RATE: 64 BPM | DIASTOLIC BLOOD PRESSURE: 86 MMHG | BODY MASS INDEX: 36.58 KG/M2 | HEIGHT: 69 IN | SYSTOLIC BLOOD PRESSURE: 127 MMHG

## 2019-03-04 DIAGNOSIS — Z01.419 WELL WOMAN EXAM WITH ROUTINE GYNECOLOGICAL EXAM: Primary | ICD-10-CM

## 2019-03-04 PROCEDURE — 99213 OFFICE O/P EST LOW 20 MIN: CPT | Performed by: OBSTETRICS & GYNECOLOGY

## 2019-03-04 PROCEDURE — G8484 FLU IMMUNIZE NO ADMIN: HCPCS | Performed by: STUDENT IN AN ORGANIZED HEALTH CARE EDUCATION/TRAINING PROGRAM

## 2019-03-04 PROCEDURE — 99395 PREV VISIT EST AGE 18-39: CPT | Performed by: STUDENT IN AN ORGANIZED HEALTH CARE EDUCATION/TRAINING PROGRAM

## 2019-03-05 LAB
HPV SAMPLE: NORMAL
HPV, GENOTYPE 16: NOT DETECTED
HPV, GENOTYPE 18: NOT DETECTED
HPV, HIGH RISK OTHER: NOT DETECTED
HPV, INTERPRETATION: NORMAL
SPECIMEN DESCRIPTION: NORMAL

## 2019-03-18 LAB — CYTOLOGY REPORT: NORMAL

## 2020-05-05 ENCOUNTER — TELEPHONE (OUTPATIENT)
Dept: ADMINISTRATIVE | Age: 35
End: 2020-05-05

## 2020-07-13 ENCOUNTER — OFFICE VISIT (OUTPATIENT)
Dept: OBGYN | Age: 35
End: 2020-07-13
Payer: COMMERCIAL

## 2020-07-13 ENCOUNTER — HOSPITAL ENCOUNTER (OUTPATIENT)
Age: 35
Setting detail: SPECIMEN
Discharge: HOME | End: 2020-07-13
Payer: COMMERCIAL

## 2020-07-13 VITALS
WEIGHT: 248 LBS | DIASTOLIC BLOOD PRESSURE: 79 MMHG | SYSTOLIC BLOOD PRESSURE: 130 MMHG | HEART RATE: 68 BPM | TEMPERATURE: 98.4 F | BODY MASS INDEX: 36.73 KG/M2 | HEIGHT: 69 IN

## 2020-07-13 PROCEDURE — 99213 OFFICE O/P EST LOW 20 MIN: CPT | Performed by: OBSTETRICS & GYNECOLOGY

## 2020-07-13 PROCEDURE — 99395 PREV VISIT EST AGE 18-39: CPT | Performed by: STUDENT IN AN ORGANIZED HEALTH CARE EDUCATION/TRAINING PROGRAM

## 2020-07-13 ASSESSMENT — PATIENT HEALTH QUESTIONNAIRE - PHQ9
2. FEELING DOWN, DEPRESSED OR HOPELESS: 0
SUM OF ALL RESPONSES TO PHQ9 QUESTIONS 1 & 2: 0
1. LITTLE INTEREST OR PLEASURE IN DOING THINGS: 0
SUM OF ALL RESPONSES TO PHQ QUESTIONS 1-9: 0
SUM OF ALL RESPONSES TO PHQ QUESTIONS 1-9: 0

## 2020-07-13 NOTE — PROGRESS NOTES
[facility] OB/GYN Annual Visit    Mickie Hives  2020                       Primary Care Physician: . None (Inactive)    CC:   Chief Complaint   Patient presents with    Gynecologic Exam    Irregular Menses     had some abnormal bleeding after sex          HPI:  Aide is a 38 y.o. female     The patient was seen and examined. She is here for an annual visit. She is complaining of some spotting after intercourse last month. This month she hasn't had issues and denies any pain with intercourse. Her No LMP recorded. She has regular periods which last about 4 day. She admits to some cramping but denies heavy bleeding. She describes them as moderate. Her bowel habits are regular. She denies any bloating. She denies dysuria. She denies urinary leaking. She denies vaginal discharge. She is sexually active with single partner. She is not using contraception and does not want to get pregnant.      Depression Screen: Symptoms of decreased mood absent  Symptoms of anhedonia absent  **If either question is answered in a  positive fashion then complete the PHQ9 Scoring Evaluation and make the appropriate referral**    REVIEW OF SYSTEMS:   Constitutional: negative fever, negative chills, negative weight changes   HEENT: negative visual disturbances, negative headaches, negative dizziness, negative hearing loss  Breast: Negative breast abnormalities, negative breast lumps, negative nipple discharge  Respiratory: negative dyspnea, negative cough, negative SOB  Cardiovascular: negative chest pain,  negative palpitations, negative arrhythmia, negative syncope   Gastrointestinal: negative abdominal pain, negative RUQ pain, negative N/V, negative diarrhea, negative constipation, negative bowel changes, negative heartburn   Genitourinary: negative dysuria, negative hematuria, negative urinary incontinence, negative vaginal discharge, some spotting with intercourse  Dermatological: negative rash, negative 61) in her maternal grandmother; Crohn's Disease in her father; Heart Disease in her paternal grandfather; Heart Failure (age of onset: 64) in her father. SOCIAL HISTORY:   reports that she has never smoked. She has never used smokeless tobacco. She reports current alcohol use. She reports that she does not use drugs. HEALTH MAINTENANCE:  Immunization status: documented  Garidisil immunization: declined      VITALS:  Vitals:    07/13/20 1354   BP: 130/79   Site: Left Upper Arm   Position: Sitting   Cuff Size: Large Adult   Pulse: 68   Temp: 98.4 °F (36.9 °C)   Weight: 248 lb (112.5 kg)   Height: 5' 9\" (1.753 m)                                                                                                                                                                          PHYSICAL EXAM:   General Appearance: Appears healthy. Alert; in no acute distress. Pleasant. Skin: Skin color, texture, turgor normal. No rashes or lesions. Lymphatic: No abnormally enlarged lymph nodes. HEENT: normocephalic and atraumatic, Thyroid normal to inspection and palpation  Respiratory: Normal expansion. Clear to auscultation. No rales, rhonchi, or wheezing.   Cardiovascular: normal rate, normal S1 and S2, no gallops, intact distal pulses and no carotid bruits  Breast:  (Chest): normal appearance, no masses or tenderness, no nipple discharge  Abdomen: soft, non-tender, non-distended, no right upper quadrant tenderness and no CVA tenderness  Pelvic Exam:   External genitalia: General appearance; normal, Hair distribution; normal, Lesions absent  Urinary system: urethral meatus normal  Vaginal: normal mucosa, no discharge  Cervix: normal appearing cervix without discharge or lesions  Adnexa: normal adnexa in size, nontender and no masses  Uterus: normal single, nontender  Rectal Exam: exam declined by patient  Musculoskeletal: no gross abnormalities  Extremities: non-tender BLE and non-edematous  Psych:  oriented to time, place and person, mood and affect are within normal limits         ASSESSMENT & PLAN:    Rashid Durant is a 38 y.o. female    - Patient with no complaints today   - Patient does not desire anything for contraception at this time   - Patient with history of LEEP, showing KRANTHI 1 on 1/15/18, Negative pap 3/4/19   - Next pap due on 3/4/22   - Vaginitis and GC/C collected today   - She declined the Garidisil immunization   - Return in 1 year   - Patient to establish with PCP     Patient Active Problem List    Diagnosis Date Noted    S/P LEEP 1/15/18 01/15/2018     KRANTHI 1      KRANTHI 1 2017     S/p LEEP  Surgical path: KRANTHI 1 with negative margins, ECC negative for dysplasia, endocervical polyp  2019 Pap negative, HPV negative        ASCUS with positive high risk HPV cervical 2016: Colposcopy performed. ECC negative, 12 o'clock biopsy KRANTHI 1  Pap smear collceted 17 - will contact patient with results (previously counseled on LEEP secondary to persistent KRANTHI 1 for 24 months vs repeat pap in 12 months - patient elected for surgery)  LEEP on 1/15/18 showed KRANTHI I with negative margins. 3/4/19: Pap negative, HPV negative         Return in about 1 year (around 2021) for Annual.  No Patient Care Coordination Note on file. Counseling Completed:    discussed need for repeat pap as per American Society for Colposcopy and Cervical Pathology guidelines. discussed birth control and barrier recommendations. discussed STD counseling and prevention. discussed Gardisil counseling for all patients 9-25 yo. Hereditary Breast, Ovarian, Colon and Uterine Cancer screening discussed. Routine health maintenance per patients PCP discussed. Patient was seen with total face to face time of 30 minutes. More than 50% of this visit was on counseling and education regarding the problems listed below and her options.  She was also counseled on her preventative health maintenance recommendations and follow-up. Diagnosis Orders   1. Annual physical exam  Vaginitis DNA Probe    Chlamydia Trachomatis & Neisseria gonorrhoeae (GC) by amplified detection   2.  S/P LEEP 1/15/18          Marla Bertrand DO  Ob/Gyn Resident  [facility]  7/13/2020, 2:43 PM

## 2020-07-14 LAB
C TRACH DNA GENITAL QL NAA+PROBE: NEGATIVE
DIRECT EXAM: NORMAL
Lab: NORMAL
N. GONORRHOEAE DNA: NEGATIVE
SPECIMEN DESCRIPTION: NORMAL
SPECIMEN DESCRIPTION: NORMAL

## 2022-01-05 ENCOUNTER — HOSPITAL ENCOUNTER (EMERGENCY)
Age: 37
Discharge: HOME | End: 2022-01-05
Attending: EMERGENCY MEDICINE
Payer: COMMERCIAL

## 2022-01-05 VITALS
OXYGEN SATURATION: 100 % | BODY MASS INDEX: 34.07 KG/M2 | SYSTOLIC BLOOD PRESSURE: 147 MMHG | WEIGHT: 230 LBS | HEART RATE: 56 BPM | TEMPERATURE: 97.7 F | DIASTOLIC BLOOD PRESSURE: 88 MMHG | HEIGHT: 69 IN | RESPIRATION RATE: 16 BRPM

## 2022-01-05 DIAGNOSIS — S16.1XXA STRAIN OF NECK MUSCLE, INITIAL ENCOUNTER: Primary | ICD-10-CM

## 2022-01-05 LAB
ABSOLUTE EOS #: 0.1 K/UL (ref 0–0.4)
ABSOLUTE IMMATURE GRANULOCYTE: NORMAL K/UL (ref 0–0.3)
ABSOLUTE LYMPH #: 2.1 K/UL (ref 1–4.8)
ABSOLUTE MONO #: 0.4 K/UL (ref 0.1–1.2)
ANION GAP SERPL CALCULATED.3IONS-SCNC: 12 MMOL/L (ref 9–17)
BASOPHILS # BLD: 1 % (ref 0–2)
BASOPHILS ABSOLUTE: 0.1 K/UL (ref 0–0.2)
BUN BLDV-MCNC: 10 MG/DL (ref 6–20)
BUN/CREAT BLD: NORMAL (ref 9–20)
CALCIUM SERPL-MCNC: 9.5 MG/DL (ref 8.6–10.4)
CHLORIDE BLD-SCNC: 101 MMOL/L (ref 98–107)
CO2: 25 MMOL/L (ref 20–31)
CREAT SERPL-MCNC: 0.54 MG/DL (ref 0.5–0.9)
DIFFERENTIAL TYPE: NORMAL
EOSINOPHILS RELATIVE PERCENT: 2 % (ref 1–4)
GFR AFRICAN AMERICAN: >60 ML/MIN
GFR NON-AFRICAN AMERICAN: >60 ML/MIN
GFR SERPL CREATININE-BSD FRML MDRD: NORMAL ML/MIN/{1.73_M2}
GFR SERPL CREATININE-BSD FRML MDRD: NORMAL ML/MIN/{1.73_M2}
GLUCOSE BLD-MCNC: 89 MG/DL (ref 70–99)
HCT VFR BLD CALC: 42.7 % (ref 36–46)
HEMOGLOBIN: 14.7 G/DL (ref 12–16)
IMMATURE GRANULOCYTES: NORMAL %
LYMPHOCYTES # BLD: 31 % (ref 24–44)
MCH RBC QN AUTO: 29.1 PG (ref 26–34)
MCHC RBC AUTO-ENTMCNC: 34.4 G/DL (ref 31–37)
MCV RBC AUTO: 84.7 FL (ref 80–100)
MONOCYTES # BLD: 6 % (ref 2–11)
NRBC AUTOMATED: NORMAL PER 100 WBC
PDW BLD-RTO: 13.3 % (ref 12.5–15.4)
PLATELET # BLD: 294 K/UL (ref 140–450)
PLATELET ESTIMATE: NORMAL
PMV BLD AUTO: 8.5 FL (ref 6–12)
POTASSIUM SERPL-SCNC: 4.2 MMOL/L (ref 3.7–5.3)
RBC # BLD: 5.04 M/UL (ref 4–5.2)
RBC # BLD: NORMAL 10*6/UL
SEG NEUTROPHILS: 60 % (ref 36–66)
SEGMENTED NEUTROPHILS ABSOLUTE COUNT: 4.2 K/UL (ref 1.8–7.7)
SODIUM BLD-SCNC: 138 MMOL/L (ref 135–144)
TSH SERPL DL<=0.05 MIU/L-ACNC: 2.21 MIU/L (ref 0.3–5)
WBC # BLD: 7 K/UL (ref 3.5–11)
WBC # BLD: NORMAL 10*3/UL

## 2022-01-05 PROCEDURE — 99283 EMERGENCY DEPT VISIT LOW MDM: CPT

## 2022-01-05 PROCEDURE — 85025 COMPLETE CBC W/AUTO DIFF WBC: CPT

## 2022-01-05 PROCEDURE — 93005 ELECTROCARDIOGRAM TRACING: CPT | Performed by: NURSE PRACTITIONER

## 2022-01-05 PROCEDURE — 80048 BASIC METABOLIC PNL TOTAL CA: CPT

## 2022-01-05 PROCEDURE — 36415 COLL VENOUS BLD VENIPUNCTURE: CPT

## 2022-01-05 PROCEDURE — 84443 ASSAY THYROID STIM HORMONE: CPT

## 2022-01-05 RX ORDER — LIDOCAINE 50 MG/G
1 PATCH TOPICAL DAILY
Qty: 10 PATCH | Refills: 0 | Status: SHIPPED | OUTPATIENT
Start: 2022-01-05 | End: 2022-01-15

## 2022-01-05 ASSESSMENT — ENCOUNTER SYMPTOMS
EYES NEGATIVE: 1
RESPIRATORY NEGATIVE: 1
GASTROINTESTINAL NEGATIVE: 1
BACK PAIN: 0

## 2022-01-05 NOTE — ED PROVIDER NOTES
[address] [facility] ED  eMERGENCY dEPARTMENT eNCOUnter   Independent Attestation     Pt Name: Peter Marie  MRN: [id number]  [place] 1985  Date of evaluation: 1/5/22       Peter Marie is a 39 y.o. female who presents with Numbness        Based on the medical record, the care appears appropriate. I was personally available for consultation in the Emergency Department.     Lubna Norris DO  Attending Emergency  Physician                 Lubna Norris DO  01/05/22 [id number]

## 2022-01-05 NOTE — ED PROVIDER NOTES
[address] [facility]  [address] [facility] [address] [facility] [address]  Phone: [phone]  Fax: [phone]        Pt Name: Abdirizak Lake  MRN: [id number]  [place] 1985  Date of evaluation: 1/5/22    [address] [place] [address]       Chief Complaint   Patient presents with    Numbness       HISTORY OF PRESENT ILLNESS (Location/Symptom, Timing/Onset, Context/Setting, Quality, Duration, Modifying Factors, Severity)      Abdirizak Lake is a 39 y.o. female with no pertinent PMH who presents to the ED via private auto with complaints of numbness and pain in the right arm which comes and goes. Patient does follow-up with Ortho and was seen on the 16th of last month for similar complaints, and x-ray was obtained then, cervalgia was diagnosed, and Ortho recommended patient follow-up with physical therapy and prescribed such. She states she has not called to schedule her first physical therapy appointment yet. Patient states she is also experiencing some episodes of anxiety, states she is been dealing with this for a while but has not followed up with a primary care or Psych. Patient denies any chest pain or shortness of breath this time. Patient denies any headache or lightheadedness at this time. She states to be calling to make her own physical therapy appointment. Patient denies any recent illness including fever, chills, body aches. She states she is not taking any medication for symptoms. Patient states in the past she has been prescribed a steroid for similar complaints, as patient does work in an office and types often, but states she did not like the way the steroid made her feel so she stopped taking it. She has no history of PE or DVT. Patient's  states that he feels he has noticed her anxiety worsening over the last couple of months. Patient did become tearful on room while discussing anxiety.  Patient reports she is experiencing episodes of dizziness which comes and goes, she thinks this is mostly related to anxiety. Patient states she is never been officially diagnosed with anxiety and depression, and has not taken any medication in the past nor seek counseling. She denies any neck stiffness or pain at this time. Patient states that discomfort is worse in her right hand and upper arm when typing. She states she has not had any episodes of dropping any items or hand twitching. Patient has no history of seizures. PAST MEDICAL / SURGICAL / SOCIAL / FAMILY HISTORY     PMH:  has a past medical history of Abnormal Pap smear, Bartholin cyst, KRANTHI I (cervical intraepithelial neoplasia I), and HSV (herpes simplex virus) infection. Surgical History:  has a past surgical history that includes Bunionectomy (Bilateral, ); Colposcopy (2017); LEEP (01/15/2018); and pr colposcopy,cervix w/adj vag,w/loop bx (N/A, 1/15/2018). Social History:  reports that she has never smoked. She has never used smokeless tobacco. She reports current alcohol use. She reports that she does not use drugs. Family History: She indicated that her mother is alive. She indicated that her father is . She indicated that her brother is alive. She indicated that her maternal grandmother is alive. She indicated that her maternal grandfather is alive. She indicated that her paternal grandmother is . She indicated that her paternal grandfather is . She indicated that her son is alive. family history includes Breast Cancer (age of onset: 61) in her maternal grandmother; Crohn's Disease in her father; Heart Disease in her paternal grandfather; Heart Failure (age of onset: 64) in her father. Psychiatric History: None    Allergies: Patient has no known allergies. Home Medications:   Prior to Admission medications    Medication Sig Start Date End Date Taking?  Authorizing Provider   lidocaine (LIDODERM) 5 % Place 1 patch onto the skin daily for 10 days 12 hours on, 12 hours off. 1/5/22 1/15/22 Yes GUILLE Ibanez NP ibuprofen (ADVIL;MOTRIN) 800 MG tablet Take 1 tablet by mouth every 8 hours as needed for Pain  Patient not taking: Reported on 7/13/2020 1/15/18   Mau Poor, DO       REVIEW OF SYSTEMS  (2-9 systems for level 4, 10 ormore for level 5)      Review of Systems   Constitutional: Negative. HENT: Negative. Eyes: Negative. Respiratory: Negative. Cardiovascular: Negative. Gastrointestinal: Negative. Endocrine: Negative. Genitourinary: Negative. Musculoskeletal: Negative for arthralgias, back pain, gait problem, joint swelling, myalgias, neck pain and neck stiffness. Right arm numbness/tingling and pain which comes and goes   Skin: Negative. Neurological: Positive for numbness. Negative for dizziness, tremors, seizures, syncope, facial asymmetry, speech difficulty, weakness, light-headedness and headaches. Hematological: Negative. Psychiatric/Behavioral: Negative. All other systems negative except as marked. PHYSICAL EXAM  (up to 7 for level 4, 8 or more for level 5)      INITIAL VITALS:  height is 5' 9\" (1.753 m) and weight is 104.3 kg (230 lb). Her oral temperature is 97.7 °F (36.5 °C). Her blood pressure is 147/88 (abnormal) and her pulse is 56. Her respiration is 16 and oxygen saturation is 100%. Vital signs reviewed. Physical Exam  Constitutional:       Appearance: Normal appearance. HENT:      Head: Normocephalic. Right Ear: Tympanic membrane, ear canal and external ear normal.      Left Ear: Tympanic membrane, ear canal and external ear normal.      Nose: Nose normal.      Mouth/Throat:      Mouth: Mucous membranes are moist.      Pharynx: Oropharynx is clear. Eyes:      Extraocular Movements: Extraocular movements intact. Conjunctiva/sclera: Conjunctivae normal.      Pupils: Pupils are equal, round, and reactive to light. Cardiovascular:      Rate and Rhythm: Normal rate and regular rhythm. Pulses: Normal pulses.       Heart sounds: Normal heart sounds. Pulmonary:      Effort: Pulmonary effort is normal.      Breath sounds: Normal breath sounds. Abdominal:      General: Bowel sounds are normal.      Palpations: Abdomen is soft. Musculoskeletal:         General: Normal range of motion. Cervical back: Normal range of motion. Skin:     General: Skin is warm and dry. Capillary Refill: Capillary refill takes less than 2 seconds. Neurological:      Mental Status: She is alert and oriented to person, place, and time. Psychiatric:         Mood and Affect: Mood normal.         Behavior: Behavior normal.         Thought Content: Thought content normal.         Judgment: Judgment normal.           DIFFERENTIAL DIAGNOSIS / MDM     On exam, patient resting comfortably in her room. Heart lung sounds within normal limits upon auscultation. Bowel sounds are present with auscultation, no abdominal pain with palpation. No spinal tenderness with palpation. She does experience some right-sided paraspinal tenseness, is not really worse with palpation, she states the pain radiates down her right arm it is worse when taking. Patient denies any chest pain or shortness of breath at this time. Patient has any history of DVT or PE. She is alert and oriented. Upper extremity strength is equal bilaterally, lower extremity strength is equal bilaterally, handgrip is equal bilaterally. Capillary refill is within normal limits. Patient appears well-hydrated, denies any nausea, vomiting, diarrhea or change in her appetite. Patient denies any change in urinary or bowel habits. Patient did have a x-ray of her neck performed with Ortho 16th of last month, who recommend that she follow-up with therapy. Patient has not yet followed up with physical therapy yet. Patient has not been taking any medication or using any lidocaine patches or anything for her discomfort.  Patient believes her anxiety is worsening as well, patient requesting information for PCP as well as psych follow-up. When discussing her anxiety, patient did become tearful. Patient feels as if she has been feeling as if she is having panic attacks, which seems to be worse while she is at work. Patients  feels like he is noticed as well. Will obtain a BMP, CBC, TSH as well as an EKG and discontinue back that patient has not had any blood work performed in quite some time. No thyroid nodules noted with palpation. Patient and her  agree with plan of care. Lab work within normal limits. Will order patient Lidoderm patch for the cervical paraspinal neck tenderness and shoulder pain. Patient encouraged to take ibuprofen and Tylenol for symptoms as well and to please follow-up with physical therapy as well as Ortho. Patient is encouraged to please follow-up with her primary care or reestablish care with a new PCP if she is not happy with her current PCP. Educated to follow-up with psych as well regarding her anxiety. Patient states an understanding of education. Patient is stable to follow-up outpatient. Patient educated please return to the emergency department any worsening of symptoms. Patient states understanding of education. Stable for discharge. PLAN (LABS / IMAGING / EKG):  Orders Placed This Encounter   Procedures    Basic Metabolic Panel    CBC Auto Differential    TSH without Reflex    EKG 12 Lead       MEDICATIONS ORDERED:  Orders Placed This Encounter   Medications    lidocaine (LIDODERM) 5 %     Sig: Place 1 patch onto the skin daily for 10 days 12 hours on, 12 hours off. Dispense:  10 patch     Refill:  0       Controlled Substances Monitoring:     DIAGNOSTIC RESULTS     EKG: All EKG's are interpreted by the Emergency Department Physician who either signs or Co-signs this chart in the absenceof a cardiologist.      RADIOLOGY: All images are read by the radiologist and their interpretations are reviewed.     No orders to display       No results found.    LABS:  Results for orders placed or performed during the hospital encounter of 01/61/47   Basic Metabolic Panel   Result Value Ref Range    Glucose 89 70 - 99 mg/dL    BUN 10 6 - 20 mg/dL    CREATININE 0.54 0.50 - 0.90 mg/dL    Bun/Cre Ratio NOT REPORTED 9 - 20    Calcium 9.5 8.6 - 10.4 mg/dL    Sodium 138 135 - 144 mmol/L    Potassium 4.2 3.7 - 5.3 mmol/L    Chloride 101 98 - 107 mmol/L    CO2 25 20 - 31 mmol/L    Anion Gap 12 9 - 17 mmol/L    GFR Non-African American >60 >60 mL/min    GFR African American >60 >60 mL/min    GFR Comment          GFR Staging NOT REPORTED    CBC Auto Differential   Result Value Ref Range    WBC 7.0 3.5 - 11.0 k/uL    RBC 5.04 4.0 - 5.2 m/uL    Hemoglobin 14.7 12.0 - 16.0 g/dL    Hematocrit 42.7 36 - 46 %    MCV 84.7 80 - 100 fL    MCH 29.1 26 - 34 pg    MCHC 34.4 31 - 37 g/dL    RDW 13.3 12.5 - 15.4 %    Platelets 556 954 - 552 k/uL    MPV 8.5 6.0 - 12.0 fL    NRBC Automated NOT REPORTED per 100 WBC    Differential Type NOT REPORTED     Seg Neutrophils 60 36 - 66 %    Lymphocytes 31 24 - 44 %    Monocytes 6 2 - 11 %    Eosinophils % 2 1 - 4 %    Basophils 1 0 - 2 %    Immature Granulocytes NOT REPORTED 0 %    Segs Absolute 4.20 1.8 - 7.7 k/uL    Absolute Lymph # 2.10 1.0 - 4.8 k/uL    Absolute Mono # 0.40 0.1 - 1.2 k/uL    Absolute Eos # 0.10 0.0 - 0.4 k/uL    Basophils Absolute 0.10 0.0 - 0.2 k/uL    Absolute Immature Granulocyte NOT REPORTED 0.00 - 0.30 k/uL    WBC Morphology NOT REPORTED     RBC Morphology NOT REPORTED     Platelet Estimate NOT REPORTED    TSH without Reflex   Result Value Ref Range    TSH 2.21 0.30 - 5.00 mIU/L   EKG 12 Lead   Result Value Ref Range    Ventricular Rate 57 BPM    Atrial Rate 57 BPM    P-R Interval 192 ms    QRS Duration 104 ms    Q-T Interval 470 ms    QTc Calculation (Bazett) 457 ms    P Axis 40 degrees    R Axis -13 degrees    T Axis -5 degrees       EMERGENCY DEPARTMENT COURSE           Vitals:    Vitals:    01/05/22 1201 01/05/22 1343 BP: (!) 161/102 (!) 147/88   Pulse: 65 56   Resp: 16 16   Temp: 98.4 °F (36.9 °C) 97.7 °F (36.5 °C)   TempSrc: Oral Oral   SpO2: 100% 100%   Weight: 104.3 kg (230 lb)    Height: 5' 9\" (1.753 m)      -------------------------  BP: (!) 147/88, Temp: 97.7 °F (36.5 °C), Pulse: 56, Resp: 16      RE-EVALUATION:  See ED Course notes above. CONSULTS:  None    PROCEDURES:  None    FINAL IMPRESSION      1. Strain of neck muscle, initial encounter          DISPOSITION / PLAN     CONDITION ON DISPOSITION:   Good / Stable for discharge. PATIENT REFERRED TO:  Trenton Gregory, [address]  [address]  [address] [facility] [phone]    Call in 1 day      [address] [facility] MD  [address] [address]  [address]  [phone]    Schedule an appointment as soon as possible for a visit       [facility] ED  [address] [facility] [place] [address] [phone]  Go to   If symptoms worsen      DISCHARGE MEDICATIONS:  New Prescriptions    LIDOCAINE (LIDODERM) 5 %    Place 1 patch onto the skin daily for 10 days 12 hours on, 12 hours off.        GUILLE Rodriguez NP   Emergency Medicine Nurse Practitioner    (Please note that portions of this note were completed with a voice recognition program.  Efforts were made to edit the dictations but occasionally words aremis-transcribed.)     GUILLE Rodriguez NP  01/05/22 8755

## 2022-01-05 NOTE — ED TRIAGE NOTES
Pt states for past 6 months has had trouble with R arm going N. Has gotten worse over past few days with it now going into her neck and feeling like she is going to pass out today. Pt has seen orthopedic doctor for this problem but they only recommended PT but has not helped.

## 2022-01-07 LAB
EKG ATRIAL RATE: 57 BPM
EKG P AXIS: 40 DEGREES
EKG P-R INTERVAL: 192 MS
EKG Q-T INTERVAL: 470 MS
EKG QRS DURATION: 104 MS
EKG QTC CALCULATION (BAZETT): 457 MS
EKG R AXIS: -13 DEGREES
EKG T AXIS: -5 DEGREES
EKG VENTRICULAR RATE: 57 BPM

## 2022-01-18 ENCOUNTER — HOSPITAL ENCOUNTER (OUTPATIENT)
Dept: PHYSICAL THERAPY | Age: 37
Setting detail: THERAPIES SERIES
Discharge: HOME | End: 2022-01-18
Payer: COMMERCIAL

## 2022-01-18 PROCEDURE — 97161 PT EVAL LOW COMPLEX 20 MIN: CPT

## 2022-01-18 PROCEDURE — 97110 THERAPEUTIC EXERCISES: CPT

## 2022-01-18 NOTE — CONSULTS
[address] [facility]  [address] [facility]         Phone: [phone]       Fax: [phone]    Physical Therapy General Evaluation    Date:  2022  Patient: Vladimir Barragan  : 1985  MRN: [id number]  Physician: Na Gómez MD    Insurance: Gini Diagnosis:  M54.2 (ICD-10-CM) - Cervicalgia  Rehab Codes: M 54.2  Onset Date:  22   Next 's appt:   Visit Count: 1/10   Cancel/No Show: 0/0    Subjective:   CC: cervicalgia   HPI: Patient is a pleasant R hand dominant female reporting chronic neck discomfort with intermittent R UE N/T. Pt notes gradual onset of discomfort for 2-3 years with no BARBARA. Increased discomfort with cervical ROM and long durations of sitting leading to increased stiffness. Intermittent R UE N/T noted from neck to encompass entire hand. Infrequent R sided headaches and N/T located on R side of face reported, leading to ED assessment  with no acute findings. No previous cervical or R UE injury or previous surgery reported. Infrequent instances of unsteadiness/spinning noted. Pt has attended chiropractic sessions on/off for 5-6 years with most recently retuning over the last week with good improvement of symptoms.     PMHx: [] Unremarkable [] Diabetes [] HTN  [] Pacemaker   [] MI/Heart Problems [] Cancer [] Arthritis [] Other:               [x] Refer to full medical chart  In EPIC     Comorbidities:   [] Obesity [] Dialysis  [] Other:   [] Asthma/COPD [] Dementia [] Other:   [] Stroke [] Sleep apnea [] Other:   [] Vascular disease [] Rheumatic disease [] Other:     Tests: [x] X-Ray: [] MRI:  [] Other:      XR CERVICALS PINE   AP and lateral radiographs of cervical spine were obtained which show loss   of cervical lordosis.  Disc spaces are otherwise well maintained.  No   acute fractures, dislocations or bony destructive processes are noted.     Soft tissues appear unremarkable.       Impression:  Loss of cervical lordosis.  Otherwise normal radiographs of   cervical spine    Medications: [x] Refer to full medical record [] None [] Other:  Allergies:      [x] Refer to full medical record [] None [] Other:      Pain location: Neck pain    Pain rating/description at rest: Discomfort - no pain    Pain rating/description at best: No pain - discomfort   Improves with: looking straight ahead, decreased movement    Pain rating/description at worst:  Discomfort - no pain   Worsens with: sitting too long, cervical flexion    Sleep quality/quanity   Sleeping position: TO BE ASSESSED       Function:  Hand Dominance  [x] Right  [] Left            Current level of function:     ADLs Independent in all except:   Dressing: indep no difficulties  Mobility: ndep no difficulties  Driving: difficulty with long durations of sitting      Job Description Works for eye doctor  Sitting at  but moving around often  Arm is high on desk to complete duties    [x] Normal Duty  [] Light Duty   [] Off D/T Condition  [] Retired    [] Not Employed    []  Disability  Return to work:        Objective:     ROM  °A/P END FEEL STRENGTH TESTS (+/-) Left Right Not Tested    Left Right  Left Right Drop Arm   []   Sit Shld Flex wnl wnl  4+/5 4+/5 Sulcus Sign   []   Sit Shld Abd wnl wnl  4+/5 4/5 Apprehension   []   Sit Shld IR    4+/5 4+/5 Yergasons   []   Shoulder Flex      Speeds - - []   Ext      Neer   []   ABD      Carbajal  - - []   ER @ 0 45 90 wnl wnl  4+/5 4+/5 Painful Arc - - []   IR      Tinel   []   Mid Trap    NT NT       Lower Trap    NT NT       Elbow Flex.     5/5 5/5       Elbow Ext.    5/5 5/5           60 80           Cervical ROM         Left        Right   Flexion 50    Extension 17    Side-bending 28 30   Rotation  55 50   No pain noted throughout     OBSERVATION No Deficit Deficit Not Tested Comments   Forward Head [] [x] []    Rounded Shoulders [] [x] []    Kyphosis [x] [] []    Scap Height/Position [] [x] [] Increased B upward rotation    Winging [x] [] []    INSPECTION/PALPATION       SC/AC Joint [x] [] []    Supraspinatus [x] [] []    Biceps tendon/groove [x] [] []    Posterior shld [x] [] []    NEUROLOGICAL       Cervical ROM/Quadrant [] [x] []    Reflexes [] [] [x]    Compression/Distraction [x] [] []    Sensation [] [x] [] Infrequent R UE N/T noted with no sensation deficits noted throughout eval        Assessment: Patient is a pleasant R hand dominant female reporting chronic neck discomfort with accompanied stiffness and R UE N/T. Overall, patient demonstrated impaired cervical ROM in all planes with increased tension and muscle guarding throughout cervical musculature. Negative Spurlings demonstrated with no sensation deficit noted. Poor posture including forward head and rounded shoulders, negatively impacting symptoms. Patient would benefit from skilled therapy to minimize symptoms, improve posture and cervical ROM, and progress scapular strength to improve QOL. STG: (to be met in 10 treatments)  1. ? Pain: Pt will report decrease discomfort to improve sitting tolerance for performing work tasks  2. ? ROM: Pt will demonstrate cervical ROM WNL to improve safety with paulina checking when driving  3. ? Strength: Pt will demonstrate improved scapular strength 4/5 or higher to improve posture and decrease use of upper trap muscles  4. ? Function: Pt will demonstrate improved postural awareness evident by minimal to no cues provided throughout session for improved form  5. Pt will report improved ergonomics at work to decrease stress to cervical spine and minimize risk of future injury   6.  Independent with Home Exercise Programs                   Patient goals: relieve the discomfort and stiffness       Evaluation Complexity:  History (Personal factors, comorbidities) [] 0 [x] 1-2 [] 3+   Exam (limitations, restrictions) [x] 1-2 [] 3 [] 4+   Clinical presentation (progression) [x] Stable [] Evolving  [] Unstable   Decision Making [x] Low [] Moderate [] High    [x] Low Complexity [] Moderate Complexity [] High Complexity        Rehab Potential:  [x] Good  [] Fair  [] Poor   Suggested Professional Referral:  [x] No  [] Yes:  Barriers to Goal Achievement[de-identified]  [x] No  [] Yes:  Domestic Concerns:  [x] No  [] Yes:       Pt. Education:  [x] Plans/Goals, Risks/Benefits discussed  [x] Home exercise program  Method of Education: [x] Verbal  [x] Demo  [x] Written  Comprehension of Education:   [x] Verbalizes understanding. [x] Demonstrates understanding. [x] Needs Review. [] Demonstrates/verbalizes understanding of HEP/Ed previously given.       Treatment Plan:  [x] Therapeutic Exercise   61305  [] Iontophoresis: 4 mg/mL Dexamethasone Sodium Phosphate  mAmin  19918   [] Therapeutic Activity  73187 [] Vasopneumatic cold with compression  99186    [] Gait Training   70970 [] Ultrasound   11415   [] Neuromuscular Re-education  02193 [] Electrical Stimulation Unattended  60404   [x] Manual Therapy  08495 [] Electrical Stimulation Attended  17306   [x] Instruction in HEP  [] Lumbar/Cervical Traction  95416   [] Aquatic Therapy   12142 [x] Cold/hotpack    [] Massage   19820      [] Dry Needling, 1 or 2 muscles  88827   [] Biofeedback, first 15 minutes   40614  [] Biofeedback, additional 15 minutes   20213 [] Dry Needling, 3 or more muscles  32458          Frequency:  1-2x/week for 10 visits        Todays Treatment:  Modalities:   Precautions: none   Exercises:   Exercise Reps/ Time Weight/ Level Comments         Sitting      Cervical ROM add     *scap squeezes 10x3\"     *Upper trap stretch  3x20\"     *Scalene stretch 3x20\"     *Levator stretch 3x20\"     *B horizontal abd 2x10 red    *D2 extension  2x10 red          *Single arm pec stretch  3x20\"     Shoulder flex/abd add     Prone Y/T      Prone rows/ext       education 5 min  Importance of therapy and HEP  Posture education and ergonomic changes at work (arms at side, hand comfortably in front of pt on desk)   Use of head prior to stretching to decrease muscle tightness          Other: * = provided for written HEP     Specific Instructions for next treatment: improve posture, improve cervical mm tightness (stretching and manual PRN), improve scapular strength, increase cervical ROM       Treatment Charges: Mins Units   [x] Evaluation       [x]  Low       []  Moderate       []  High 25 1   []  Modalities     [x]  Ther Exercise 25 2   []  Manual Therapy     []  Ther Activities     []  Aquatics     []  Neuromuscular     []  Gait Training     []  Dry Needling           1-2 muscles     []  Dry Needling           3 or more muscles     [] Vasocompression     []  Other       TOTAL TREATMENT TIME: 50 min     Time in: 7:08am   Time Out: 7:58am    Electronically signed by: Jerolyn Snellen, PT

## 2022-01-25 ENCOUNTER — HOSPITAL ENCOUNTER (OUTPATIENT)
Dept: PHYSICAL THERAPY | Age: 37
Setting detail: THERAPIES SERIES
Discharge: HOME | End: 2022-01-25
Payer: COMMERCIAL

## 2022-01-28 ENCOUNTER — APPOINTMENT (OUTPATIENT)
Dept: PHYSICAL THERAPY | Age: 37
End: 2022-01-28
Payer: COMMERCIAL

## 2022-02-01 ENCOUNTER — HOSPITAL ENCOUNTER (OUTPATIENT)
Dept: PHYSICAL THERAPY | Age: 37
Setting detail: THERAPIES SERIES
Discharge: HOME | End: 2022-02-01
Payer: COMMERCIAL

## 2022-02-01 ENCOUNTER — OFFICE VISIT (OUTPATIENT)
Dept: FAMILY MEDICINE CLINIC | Age: 37
End: 2022-02-01
Payer: COMMERCIAL

## 2022-02-01 VITALS
TEMPERATURE: 97 F | WEIGHT: 258 LBS | BODY MASS INDEX: 38.21 KG/M2 | SYSTOLIC BLOOD PRESSURE: 118 MMHG | HEART RATE: 56 BPM | HEIGHT: 69 IN | DIASTOLIC BLOOD PRESSURE: 86 MMHG | OXYGEN SATURATION: 98 %

## 2022-02-01 DIAGNOSIS — F32.A DEPRESSION, UNSPECIFIED DEPRESSION TYPE: ICD-10-CM

## 2022-02-01 DIAGNOSIS — R10.9 RIGHT FLANK PAIN: ICD-10-CM

## 2022-02-01 DIAGNOSIS — M54.2 NECK PAIN: Primary | ICD-10-CM

## 2022-02-01 DIAGNOSIS — F41.9 ANXIETY: ICD-10-CM

## 2022-02-01 LAB
APPEARANCE FLUID: NORMAL
BILIRUBIN, POC: NORMAL
BLOOD URINE, POC: NORMAL
CLARITY, POC: CLEAR
COLOR, POC: YELLOW
GLUCOSE URINE, POC: NORMAL
KETONES, POC: NORMAL
LEUKOCYTE EST, POC: NORMAL
NITRITE, POC: NORMAL
PH, POC: 5
PROTEIN, POC: NORMAL
SPECIFIC GRAVITY, POC: 1.02
UROBILINOGEN, POC: NORMAL

## 2022-02-01 PROCEDURE — 1036F TOBACCO NON-USER: CPT | Performed by: NURSE PRACTITIONER

## 2022-02-01 PROCEDURE — G8484 FLU IMMUNIZE NO ADMIN: HCPCS | Performed by: NURSE PRACTITIONER

## 2022-02-01 PROCEDURE — G8427 DOCREV CUR MEDS BY ELIG CLIN: HCPCS | Performed by: NURSE PRACTITIONER

## 2022-02-01 PROCEDURE — 99214 OFFICE O/P EST MOD 30 MIN: CPT | Performed by: NURSE PRACTITIONER

## 2022-02-01 PROCEDURE — 97140 MANUAL THERAPY 1/> REGIONS: CPT

## 2022-02-01 PROCEDURE — G8417 CALC BMI ABV UP PARAM F/U: HCPCS | Performed by: NURSE PRACTITIONER

## 2022-02-01 PROCEDURE — 81002 URINALYSIS NONAUTO W/O SCOPE: CPT | Performed by: NURSE PRACTITIONER

## 2022-02-01 PROCEDURE — 97110 THERAPEUTIC EXERCISES: CPT

## 2022-02-01 SDOH — ECONOMIC STABILITY: TRANSPORTATION INSECURITY
IN THE PAST 12 MONTHS, HAS LACK OF TRANSPORTATION KEPT YOU FROM MEETINGS, WORK, OR FROM GETTING THINGS NEEDED FOR DAILY LIVING?: NO

## 2022-02-01 SDOH — ECONOMIC STABILITY: FOOD INSECURITY: WITHIN THE PAST 12 MONTHS, YOU WORRIED THAT YOUR FOOD WOULD RUN OUT BEFORE YOU GOT MONEY TO BUY MORE.: NEVER TRUE

## 2022-02-01 SDOH — ECONOMIC STABILITY: TRANSPORTATION INSECURITY
IN THE PAST 12 MONTHS, HAS THE LACK OF TRANSPORTATION KEPT YOU FROM MEDICAL APPOINTMENTS OR FROM GETTING MEDICATIONS?: NO

## 2022-02-01 SDOH — ECONOMIC STABILITY: FOOD INSECURITY: WITHIN THE PAST 12 MONTHS, THE FOOD YOU BOUGHT JUST DIDN'T LAST AND YOU DIDN'T HAVE MONEY TO GET MORE.: NEVER TRUE

## 2022-02-01 SDOH — ECONOMIC STABILITY: INCOME INSECURITY: HOW HARD IS IT FOR YOU TO PAY FOR THE VERY BASICS LIKE FOOD, HOUSING, MEDICAL CARE, AND HEATING?: NOT HARD AT ALL

## 2022-02-01 ASSESSMENT — PATIENT HEALTH QUESTIONNAIRE - PHQ9
2. FEELING DOWN, DEPRESSED OR HOPELESS: 1
SUM OF ALL RESPONSES TO PHQ QUESTIONS 1-9: 1
SUM OF ALL RESPONSES TO PHQ QUESTIONS 1-9: 1
1. LITTLE INTEREST OR PLEASURE IN DOING THINGS: 0
SUM OF ALL RESPONSES TO PHQ9 QUESTIONS 1 & 2: 1
SUM OF ALL RESPONSES TO PHQ QUESTIONS 1-9: 1
SUM OF ALL RESPONSES TO PHQ QUESTIONS 1-9: 1

## 2022-02-01 ASSESSMENT — ENCOUNTER SYMPTOMS
SHORTNESS OF BREATH: 0
BLOOD IN STOOL: 0
ABDOMINAL PAIN: 0
CHEST TIGHTNESS: 0

## 2022-02-01 NOTE — FLOWSHEET NOTE
[address] [facility]   [address]   Phone: [phone]   Fax: [phone]    Physical Therapy Daily Treatment Note      Date:  2022  Patient Name:  Seng Cheatham    :  1985  MRN: [id number]  Physician: Anna Hugo MD                          Insurance: [employer] Diagnosis:   M54.2 (ICD-10-CM) - Cervicalgia  Rehab Codes: M 54.2  Onset Date:    22              Next 's appt:   Visit Count: 1/10                                Cancel/No Show: 0/0    Subjective:    Pain:  [x] Yes  [] No Location: Pain Rating: (0-10 scale) 0/10  Pain altered Tx:  [x] No  [] Yes  Action:  Comments: Pt notes improvement of neck discomfort and R UE N/T since completing HEP in combination of chiropractic sessions. Improvement of ergonomics throughout work day noted as previously discussed in initial evaluation.        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[employer]   Exercise Reps/ Time Weight/ Level Comments Complete 2022                Sitting          Cervical ROM 10x     X   *scap squeezes 10x3\"        *Upper trap stretch  3x20\"     X   *Scalene stretch 3x20\"     X   *Levator stretch 3x20\"        *B horizontal abd 2x10 green   X   *D2 extension  2x10 Green    X              *Single arm pec stretch  3x20\"     X   Shoulder flex/abd add               Prone Y/T  10x2     X   Prone rows/ext   add        Side-lying open books  10x   X   Cat-camel 10x   X                     Other: * = provided for written HEP     Manual: STM to B SCM, scalenes, cervical paraspinals, and pec     Specific Instructions for next treatment: improve posture, improve cervical mm tightness (stretching and manual PRN), improve scapular strength, increase cervical ROM         Assessment: [x] Progressing toward goals. Completed first session post initial evaluation with good tolerance.  Improved cervical ROM with decreased tightness noted throughout. Faciliated cat/camel as well as side-lying open books to improve thoracic mobility. Prone scapular strengthening added, with decent tolerance, to improve posture. Manual provided at end of session to decrease muscle tension with improved tightness noted post. Improved scapular depression noted but education regarding improving scapular retraction and posterior tilt and maintaining throughout work day and ADLs, pt verbalizes understanding. [] No change. [] Other:    [x] Patient would benefit from skilled therapy to minimize symptoms, improve posture and cervical ROM, and progress scapular strength to improve QOL.        STG: (to be met in 10 treatments)  1. ? Pain: Pt will report decrease discomfort to improve sitting tolerance for performing work tasks  2. ? ROM: Pt will demonstrate cervical ROM WNL to improve safety with paulina checking when driving  3. ? Strength: Pt will demonstrate improved scapular strength 4/5 or higher to improve posture and decrease use of upper trap muscles  4. ? Function: Pt will demonstrate improved postural awareness evident by minimal to no cues provided throughout session for improved form  5. Pt will report improved ergonomics at work to decrease stress to cervical spine and minimize risk of future injury   6. Independent with Home Exercise Programs                    Patient goals: relieve the discomfort and stiffness     Pt. Education:  [x] Yes  [] No  [x] Reviewed Prior HEP/Ed  Method of Education: [x] Verbal  [x] Demo  [] Written  Comprehension of Education:  [x] Verbalizes understanding. [] Demonstrates understanding. [x] Needs review. [] Demonstrates/verbalizes HEP/Ed previously given. Plan: [x] Continue per plan of care.   Frequency:  1-2x/week for 10 visits   [] Other:      Treatment Charges: Mins Units   []  Modalities     [x]  Ther Exercise 35 2   [x]  Manual Therapy 10 1   []  Ther Activities     []  Aquatics     []  Neuromuscular [] Vasocompression     [] Gait Training     [] Dry needling        [] 1 or 2 muscles        [] 3 or more muscles     []  Other     Total Treatment time 45 3     Time In:  4657        Time Out: 0900    Electronically signed by:  Marianna Umanzor PT

## 2022-02-01 NOTE — PROGRESS NOTES
Subjective:      Patient ID: Danny Francois is a 44 y.o. female. Visit Information    Have you changed or started any medications since your last visit including any over-the-counter medicines, vitamins, or herbal medicines? no   Are you having any side effects from any of your medications? -  no  Have you stopped taking any of your medications? Is so, why? -  no    Have you seen any other physician or provider since your last visit? No  Have you had any other diagnostic tests since your last visit? No  Have you been seen in the emergency room and/or had an admission to a hospital since we last saw you? No  Have you had your routine dental cleaning in the past 6 months? no    Have you activated your [employer] account? If not, what are your barriers? Yes     Patient Care Team:  Samra Kirkland MD as PCP - General (Family Medicine)  Alisa Fung MD as PCP - [facility] Provider    Medical History Review  Past Medical, Family, and Social History reviewed and does not contribute to the patient presenting condition    Health Maintenance   Topic Date Due    Hepatitis C screen  Never done    COVID-19 Vaccine (1) Never done    DTaP/Tdap/Td vaccine (1 - Tdap) Never done    Flu vaccine (1) Never done    Depression Monitoring  2023    Cervical cancer screen  2024    Hepatitis A vaccine  Aged Out    Hepatitis B vaccine  Aged Out    Hib vaccine  Aged Out    Meningococcal (ACWY) vaccine  Aged Out    Pneumococcal 0-64 years Vaccine  Aged Out    Varicella vaccine  Discontinued    HIV screen  Discontinued     HPI    44year old female presents with management of followin. Neck pain - on and off for 2 years. Currently pt follows up with chiropractor and states paresthesias in both hands are going away. 2. Anxiety /depression -  Intermittently for 3 years. States there is a lot of family issues that makes her anxious and depressed. Currently follows up with I counseling.  Denies homicidal or suicidal ideation  3. Right flank pain - intermittent for 3-4 weeks. States she feels there is something in the area and pain improves with movements. UA was unremarkable today. Denies fever chills cough cp sob or nausea vomiting, denies urinary urgency frequency or blood in the urine. Hx of kidney stone. Pt is non smoker, denies alcohol or illicit drug use. Review of Systems   Constitutional: Negative for chills and fever. Respiratory: Negative for chest tightness and shortness of breath. Cardiovascular: Negative for chest pain. Gastrointestinal: Negative for abdominal pain and blood in stool. Genitourinary: Positive for flank pain (right ). Negative for dysuria and hematuria. Musculoskeletal: Positive for neck pain and neck stiffness. Neurological: Negative for dizziness, weakness and numbness. Psychiatric/Behavioral: Positive for dysphoric mood. Negative for agitation, behavioral problems, sleep disturbance and suicidal ideas. The patient is nervous/anxious. Objective:   Physical Exam  Vitals and nursing note reviewed. Constitutional:       General: She is not in acute distress. Appearance: Normal appearance. She is obese. HENT:      Nose: Nose normal.   Eyes:      Conjunctiva/sclera: Conjunctivae normal.   Cardiovascular:      Rate and Rhythm: Normal rate and regular rhythm. Heart sounds: Normal heart sounds. Pulmonary:      Effort: Pulmonary effort is normal. No respiratory distress. Breath sounds: Normal breath sounds. Abdominal:      Palpations: Abdomen is soft. Tenderness: There is abdominal tenderness. Musculoskeletal:         General: Normal range of motion. Cervical back: Neck supple. Lymphadenopathy:      Cervical: No cervical adenopathy. Skin:     General: Skin is warm and dry. Neurological:      Mental Status: She is alert and oriented to person, place, and time. Cranial Nerves: No cranial nerve deficit. Psychiatric:         Mood and Affect: Mood normal.         Behavior: Behavior normal.         Assessment:      1. Neck pain    2. Anxiety    3. Depression, unspecified depression type    4. Right flank pain            Plan:      BP Readings from Last 3 Encounters:   02/01/22 118/86   01/05/22 (!) 147/88   09/14/20 120/86     /86   Pulse 56   Temp 97 °F (36.1 °C) (Infrared)   Ht 5' 9\" (1.753 m)   Wt 258 lb (117 kg)   LMP 01/05/2022   SpO2 98%   BMI 38.10 kg/m²   Lab Results   Component Value Date    WBC 7.0 01/05/2022    HGB 14.7 01/05/2022    HCT 42.7 01/05/2022     01/05/2022     01/05/2022    K 4.2 01/05/2022     01/05/2022    CREATININE 0.54 01/05/2022    BUN 10 01/05/2022    CO2 25 01/05/2022    TSH 2.21 01/05/2022     Lab Results   Component Value Date    CALCIUM 9.5 01/05/2022     No results found for: LDLCALC, LDLCHOLESTEROL, LDLDIRECT      1. Neck pain  - stable. Cont chiropractor     2. Anxiety/ 3. Depression, unspecified depression type  - stable. Pt declined pharmacotherapy at this time  - cont [facility] counseling as scheduled     4. Right flank pain  - POCT Urinalysis no Micro - negative. - CT KIDNEY WO CONTRAST; Future    Requested Prescriptions      No prescriptions requested or ordered in this encounter       Medications Discontinued During This Encounter   Medication Reason    ibuprofen (ADVIL;MOTRIN) 800 MG tablet LIST CLEANUP        All patient questions answered. Pt voiced understanding. No follow-ups on file.

## 2022-02-10 ENCOUNTER — HOSPITAL ENCOUNTER (OUTPATIENT)
Dept: PHYSICAL THERAPY | Age: 37
Setting detail: THERAPIES SERIES
Discharge: HOME | End: 2022-02-10
Payer: COMMERCIAL

## 2022-02-10 PROCEDURE — 97110 THERAPEUTIC EXERCISES: CPT

## 2022-02-10 NOTE — FLOWSHEET NOTE
[facility]   [address]   Phone: [phone]   Fax: [phone]    Physical Therapy Daily Treatment Note      Date:  2/10/2022  Patient Name:  Cole Chavez    :  1985  MRN: [id number]  Physician: Yohana Sanders MD                          Insurance: [employer] Diagnosis:   M54.2 (ICD-10-CM) - Cervicalgia  Rehab Codes: M 54.2  Onset Date:    22              Next 's appt:   Visit Count: 3/10                                Cancel/No Show:     Subjective:   Patient states she has noticed overall improvement since beginning therapy and has been more aware of posture when at work. Patient states she has not been compliant with strengthening HEP. 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[employer]   Exercise Reps/ Time Weight/ Level Comments Complete 2/10/2022                Sitting          Cervical ROM 10x     X   *scap squeezes 10x3\"        *Upper trap stretch  3x20\"     X   *Scalene stretch 2x20\"     X   *Levator stretch 3x20\"        *B horizontal abd 2x10 green   X   B ER 10x2 green  X   *D2 extension  2x10 Green    X              *Single arm pec stretch  2x20\"     X   Shoulder flex/abd 10x 2 2#   X          Prone Y/T  10x2     X   Prone rows/ext   10x2     X   Side-lying open books  10x   X   Cat-camel 10x   X                     Other: * = provided for written HEP     Manual: STM to B SCM, scalenes, cervical paraspinals, and pec  Held 2/10/22    Specific Instructions for next treatment: improve posture, improve cervical mm tightness (stretching and manual PRN), improve scapular strength, increase cervical ROM         Assessment: [x] Progressing toward goals.  Began session with cervical stretching and ROM to decrease stiffness and improve mobility. Minimal cues needed to correct patient's sitting posture. Patient educated on performing strengthening HEP at home to progress postural strengthening and improve overall posture. Cues as needed correct patient's technique throughout exercises for proper execution. Added prone rows/extension and shoulder flexion and ABD to continue with strengthening and ROM. [] No change. [] Other:    [x] Patient would benefit from skilled therapy to minimize symptoms, improve posture and cervical ROM, and progress scapular strength to improve QOL.        STG: (to be met in 10 treatments)  1. ? Pain: Pt will report decrease discomfort to improve sitting tolerance for performing work tasks  2. ? ROM: Pt will demonstrate cervical ROM WNL to improve safety with paulina checking when driving  3. ? Strength: Pt will demonstrate improved scapular strength 4/5 or higher to improve posture and decrease use of upper trap muscles  4. ? Function: Pt will demonstrate improved postural awareness evident by minimal to no cues provided throughout session for improved form  5. Pt will report improved ergonomics at work to decrease stress to cervical spine and minimize risk of future injury   6. Independent with Home Exercise Programs                    Patient goals: relieve the discomfort and stiffness     Pt. Education:  [x] Yes  [] No  [x] Reviewed Prior HEP/Ed  Method of Education: [x] Verbal  [x] Demo  [] Written  Comprehension of Education:  [x] Verbalizes understanding. [] Demonstrates understanding. [x] Needs review. [] Demonstrates/verbalizes HEP/Ed previously given. Plan: [x] Continue per plan of care.   Frequency:  1-2x/week for 10 visits   [] Other:      Treatment Charges: Mins Units   []  Modalities     [x]  Ther Exercise 40 3   [x]  Manual Therapy     []  Ther Activities     []  Aquatics     []  Neuromuscular     [] Vasocompression     [] Gait Training     [] Dry needling [] 1 or 2 muscles        [] 3 or more muscles     []  Other     Total Treatment time 40 3     Time In:  2718        Time Out: 0815  Electronically signed by:  Akanksha Nunn PTA

## 2022-02-15 ENCOUNTER — HOSPITAL ENCOUNTER (OUTPATIENT)
Dept: PHYSICAL THERAPY | Age: 37
Setting detail: THERAPIES SERIES
Discharge: HOME | End: 2022-02-15
Payer: COMMERCIAL

## 2022-02-15 PROCEDURE — 97110 THERAPEUTIC EXERCISES: CPT

## 2022-02-15 NOTE — FLOWSHEET NOTE
[address] [facility] [address] [facility]   [address]   Phone: [phone]   Fax: [phone]    Physical Therapy Daily Treatment Note      Date:  2/15/2022  Patient Name:  Suhail Thomas    :  1985  MRN: [id number]  Physician: Natalie Valdez MD                          Insurance: [employer] Diagnosis:   M54.2 (ICD-10-CM) - Cervicalgia  Rehab Codes: M 54.2  Onset Date:    22              Next 's appt:   Visit Count: 4/10                                Cancel/No Show: 10    Subjective:     Pain:  [x] Yes  [] No Location: R side of neck Pain Rating: (0-10 scale) 0/10  Pain altered Tx:  [x] No  [] Yes  Action:  Comments: Pt notes continued improved neck and mid back pain and tightness. Occasional mid back pain noted when trying to sleep but improved with HEP.  Pt states combination of PT and chiropractic sessions are demonstrating great results.        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[email or web address]   Exercise Reps/ Time Weight/ Level Comments Complete 2/15/2022     UBE 2/  Forward/retro  Cues for posture  X          Standing        Shoulder flex/abd 2x10 2#  X   Wall walks 5x yellow  X   Single arm stretch 3x30\"   X   Y wall lifts  10x3\"   X              Sitting          Cervical ROM 10x     X   *scap squeezes 10x3\"        *Upper trap stretch  3x20\"     X   *Scalene stretch 2x20\"     X   *Levator stretch 3x20\"     X   Rhomboid stretch 2x30\"   X   *B horizontal abd 2x10 green   X   B ER 10x2 green  X   *D2 extension  2x10 Green                  Prone Y 10x2 1#   X   Prone T 10x2 2#  X   Prone rows/ext   10x2 2#   X   Side-lying open books  10x   X   Cat-camel 10x   X              Other: * = provided for written HEP     Manual: STM to B SCM, scalenes, cervical paraspinals, and pec  Held 2/10/22    Specific Instructions for next treatment: improve posture, improve cervical mm tightness (stretching and manual PRN), improve scapular strength, increase cervical ROM         Assessment: [x] Progressing toward goals. Began session with facilitation of UBE to improve dynamic postural awareness. Addition of standing Y lifts and wall walks, as well as weight to prone scapular exercises to improve scapular strength and improve posture. Cues provided throughout session to decrease speed for improved control. [] No change. [] Other:    [x] Patient would benefit from skilled therapy to minimize symptoms, improve posture and cervical ROM, and progress scapular strength to improve QOL.        STG: (to be met in 10 treatments)  1. ? Pain: Pt will report decrease discomfort to improve sitting tolerance for performing work tasks  2. ? ROM: Pt will demonstrate cervical ROM WNL to improve safety with paulina checking when driving  3. ? Strength: Pt will demonstrate improved scapular strength 4/5 or higher to improve posture and decrease use of upper trap muscles  4. ? Function: Pt will demonstrate improved postural awareness evident by minimal to no cues provided throughout session for improved form  5. Pt will report improved ergonomics at work to decrease stress to cervical spine and minimize risk of future injury   6. Independent with Home Exercise Programs                    Patient goals: relieve the discomfort and stiffness     Pt. Education:  [x] Yes  [] No  [x] Reviewed Prior HEP/Ed  Method of Education: [x] Verbal  [x] Demo  [] Written  Comprehension of Education:  [x] Verbalizes understanding. [] Demonstrates understanding. [x] Needs review. [] Demonstrates/verbalizes HEP/Ed previously given. Plan: [x] Continue per plan of care.   Frequency:  1-2x/week for 10 visits   [] Other:      Treatment Charges: Mins Units   []  Modalities     [x]  Ther Exercise 40 3   [x]  Manual Therapy     []  Ther Activities     []  Aquatics     []  Neuromuscular     [] Vasocompression     [] Gait Training     [] Dry needling        [] 1 or 2 muscles        [] 3 or more muscles     []  Other     Total Treatment time 40 3     Time In:  0820       Time Out: 0900    Electronically signed by:  Jese Sousa PT

## 2022-02-22 ENCOUNTER — APPOINTMENT (OUTPATIENT)
Dept: PHYSICAL THERAPY | Age: 37
End: 2022-02-22
Payer: COMMERCIAL

## 2022-02-23 ENCOUNTER — HOSPITAL ENCOUNTER (OUTPATIENT)
Dept: PHYSICAL THERAPY | Age: 37
Setting detail: THERAPIES SERIES
Discharge: HOME | End: 2022-02-23
Payer: COMMERCIAL

## 2022-02-23 PROCEDURE — 97110 THERAPEUTIC EXERCISES: CPT

## 2022-02-23 NOTE — FLOWSHEET NOTE
[facility]   [address] [place] [address] [address]   Phone: [phone]   Fax: [phone]    Physical Therapy Daily Treatment Note      Date:  2022  Patient Name:  Bev Power    :  1985  MRN: [id number]  Physician: Vincenzo Rene MD                          Insurance: [address] [place] [address] # of visits allowed/remainin, Auth Required after  visit   Medical Diagnosis:   M54.2 (ICD-10-CM) - Cervicalgia  Rehab Codes: M 54.2  Onset Date:    22              Next 's appt:   Visit Count: 5/10                                Cancel/No Show:     Subjective:  Patient reporting to therapy with no new complaints stating she has been doing very well lately.   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[employer]   Exercise Reps/ Time Weight/ Level Comments Complete 2022     UBE   Forward/retro  Cues for posture  X          Standing        Shoulder flex/abd 2x10 2#  X   Wall walks 5x yellow  X   Single arm stretch 2x 20\"   X   Y wall lifts  10x3\"   X              Sitting          Cervical ROM 10x     X   *scap squeezes 10x3\"        *Upper trap stretch  2x20\"     X   *Scalene stretch 2x20\"     X   *Levator stretch 2x20\"     X   Rhomboid stretch 2x30\"      *B horizontal abd 2x10 green   X   B ER 10x2 green  X   *D2 extension  2x10 Green                  Prone Y 10x2 1#   X   Prone T 10x2 2#  X   Prone rows/ext   10x2 2#   X   Side-lying open books  10x      Cat-camel 10x                 Other: * = provided for written HEP     Manual: STM to B SCM, scalenes, cervical paraspinals, and pec  Held 2/10/22    Specific Instructions for next treatment: improve posture, improve cervical mm tightness (stretching and manual PRN), improve scapular strength, increase cervical ROM       Assessment: [x] Progressing toward goals. Began session on UBE and followed with seated stretches to improve mobility. During strengthening exercises, patient reports increased tightness in UT with patient verbalizing understanding to maintain scapular depression throughout exercises. Cues as needed for slow controlled movement. Will continue with stretches to promote relaxation in cervical spine and strengthening to improve postural muscles. [] No change. [] Other:    [x] Patient would benefit from skilled therapy to minimize symptoms, improve posture and cervical ROM, and progress scapular strength to improve QOL.        STG: (to be met in 10 treatments)  1. ? Pain: Pt will report decrease discomfort to improve sitting tolerance for performing work tasks  2. ? ROM: Pt will demonstrate cervical ROM WNL to improve safety with paulina checking when driving  3. ? Strength: Pt will demonstrate improved scapular strength 4/5 or higher to improve posture and decrease use of upper trap muscles  4. ? Function: Pt will demonstrate improved postural awareness evident by minimal to no cues provided throughout session for improved form  5. Pt will report improved ergonomics at work to decrease stress to cervical spine and minimize risk of future injury   6. Independent with Home Exercise Programs                    Patient goals: relieve the discomfort and stiffness     Pt. Education:  [x] Yes  [] No  [x] Reviewed Prior HEP/Ed  Method of Education: [x] Verbal  [x] Demo  [] Written  Comprehension of Education:  [x] Verbalizes understanding. [] Demonstrates understanding. [x] Needs review. [] Demonstrates/verbalizes HEP/Ed previously given. Plan: [x] Continue per plan of care.   Frequency:  1-2x/week for 10 visits   [] Other:      Treatment Charges: Mins Units   []  Modalities     [x]  Ther Exercise 45 3   [x]  Manual Therapy     []  Ther Activities     []  Aquatics     []  Neuromuscular     [] Vasocompression     [] Gait Training     [] Dry needling        [] 1 or 2 muscles        [] 3 or more muscles     []  Other     Total Treatment time 45 3     Time In:  0089       Time Out: 5938    Electronically signed by:  Akanksha Nunn PTA

## 2022-03-02 ENCOUNTER — HOSPITAL ENCOUNTER (OUTPATIENT)
Dept: PHYSICAL THERAPY | Age: 37
Setting detail: THERAPIES SERIES
Discharge: HOME | End: 2022-03-02
Payer: COMMERCIAL

## 2022-03-02 NOTE — FLOWSHEET NOTE
[x] [facility]) - [facility] & Therapy  [address]  [place]: [phone]   F: [phone]     Physical Therapy Cancel/No Show note    Date: 3/2/2022  Patient: Lupe Alvarado  : 1985  MRN: [id number]    Visit Count: 5/10  Cancels/No Shows to date:     For today's appointment patient:    []  Cancelled    [] Rescheduled appointment    [x] No-show     Reason given by patient:    []  Patient ill    []  Conflicting appointment    [] No transportation      [] Conflict with work    [] No reason given    [] Weather related    [] COVID-19    [x] Other:      Comments:  Pt was contacted and states she forgot she had rescheduled her appt for this evening. [x] Next appointment was confirmed:  Wednesday, 3/9 6:16PM    Electronically signed by: Shakira Negron PTA

## 2022-03-09 ENCOUNTER — HOSPITAL ENCOUNTER (OUTPATIENT)
Dept: PHYSICAL THERAPY | Age: 37
Setting detail: THERAPIES SERIES
Discharge: HOME | End: 2022-03-09
Payer: COMMERCIAL

## 2022-03-09 PROCEDURE — 97110 THERAPEUTIC EXERCISES: CPT

## 2022-03-09 NOTE — FLOWSHEET NOTE
[address] [facility]   [address] [place] [address] [address]   Phone: [phone]   Fax: [phone]    Physical Therapy Daily Treatment Note      Date:  3/9/2022  Patient Name:  Dmitri Suazo    :  1985  MRN: [id number]  Physician: Miguelina Ambriz MD                          Insurance: [address] [place] [address] # of visits allowed/remainin, Auth Required after  visit   Medical Diagnosis:   M54.2 (ICD-10-CM) - Cervicalgia  Rehab Codes: M 54.2  Onset Date:    22              Next 's appt:   Visit Count: 6/10                                Cancel/No Show:     Subjective:  Patient reporting to therapy with no new complaints. Patient reporting overall decreased pain and improved tolerance to daily routine activities.   Pain:  [] Yes  [] No Location: R side of neck Pain Rating: (0-10 scale) discomfort/tightness/10  Pain altered Tx:  [x] No  [] Yes  Action:  Comments:        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[employer]   Exercise Reps/ Time Weight/ Level Comments Complete 3/9/2022     UBE 2/2 2 Forward/retro  Cues for posture  X          Standing        Shoulder flex/abd 2x10 2#  X   Wall walks 5x yellow  X   Single arm stretch 2x 20\"   X   Y wall lifts  10x3\"   X   Rows/extension 10x2 3\"  blue  X   \"W\" rows 10x2 blue  X   Horizontal ABD 10x2 blue  X   Wall push ups 10x     X   Sitting          Cervical ROM 10x        *scap squeezes 10x3\"        *Upper trap stretch  2x20\"        *Scalene stretch 2x20\"        *Levator stretch 2x20\"        Rhomboid stretch 2x30\"      *B horizontal abd 2x10 green      B ER 10x2 green     *D2 extension  2x10 Green                  Prone Y 10x2 1#      Prone T 10x2 2#     Prone rows/ext   10x2 2#      Side-lying open books  10x      Cat-camel 10x                 Other: * = provided for written HEP     Manual: STM to B SCM, scalenes, cervical paraspinals, and pec  Held 2/10/22    Specific Instructions for next treatment: improve posture, improve cervical mm tightness (stretching and manual PRN), improve scapular strength, increase cervical ROM, TB HEP          Assessment: [x] Progressing toward goals. Focused this session on standing strengthening exercises progressing most exercises to blue TB without difficulty or pain. Occasional cues needed for proper posture and technique to promote slow, controlled movements. [] No change. [] Other:    [x] Patient would benefit from skilled therapy to minimize symptoms, improve posture and cervical ROM, and progress scapular strength to improve QOL.        STG: (to be met in 10 treatments)  1. ? Pain: Pt will report decrease discomfort to improve sitting tolerance for performing work tasks  2. ? ROM: Pt will demonstrate cervical ROM WNL to improve safety with paulina checking when driving  3. ? Strength: Pt will demonstrate improved scapular strength 4/5 or higher to improve posture and decrease use of upper trap muscles  4. ? Function: Pt will demonstrate improved postural awareness evident by minimal to no cues provided throughout session for improved form  5. Pt will report improved ergonomics at work to decrease stress to cervical spine and minimize risk of future injury   6. Independent with Home Exercise Programs                    Patient goals: relieve the discomfort and stiffness     Pt. Education:  [x] Yes  [] No  [x] Reviewed Prior HEP/Ed  Method of Education: [x] Verbal  [x] Demo  [] Written  Comprehension of Education:  [x] Verbalizes understanding. [] Demonstrates understanding. [x] Needs review. [] Demonstrates/verbalizes HEP/Ed previously given. Plan: [x] Continue per plan of care.   Frequency:  1-2x/week for 10 visits   [] Other:      Treatment Charges: Mins Units   []  Modalities     [x]  Ther Exercise 40 3   [x]  Manual Therapy     []  Ther Activities     []  Aquatics     []  Neuromuscular     [] Vasocompression     [] Gait Training     [] Dry needling        [] 1 or 2 muscles        [] 3 or more muscles     []  Other     Total Treatment time 40 3     Time In:  8264      Time Out: 5707    Electronically signed by:  Shoshana Ledesma PTA

## 2022-03-16 ENCOUNTER — APPOINTMENT (OUTPATIENT)
Dept: PHYSICAL THERAPY | Age: 37
End: 2022-03-16
Payer: COMMERCIAL

## 2022-03-17 ENCOUNTER — APPOINTMENT (OUTPATIENT)
Dept: PHYSICAL THERAPY | Age: 37
End: 2022-03-17
Payer: COMMERCIAL

## 2022-05-10 NOTE — DISCHARGE SUMMARY
[address] [facility]   [address]   Phone: [phone]   Fax: [phone]        Physical Therapy Discharge Note    Date: 5/10/2022      Patient: Vladimir Barragan  : 1985  MRN: [id number]    [address], [address] # of visits allowed/remainin, Auth Required after 30th visit   Medical Diagnosis:   M54.2 (ICD-10-CM) - Cervicalgia  Rehab Codes: M 54.2  Onset Date:    22              Next 's appt:   Total visits attended: 6/10  Cancels/No shows:  (corrected 5/10/22)  Date of initial visit: 22                 Date of final visit: 3/9/22       Discharge Status:     Pt failed to make additional appointments for therapy. Pt. Is now discharged. Electronically signed by: Latisha Suarez PT    If you have any questions or concerns, please don't hesitate to call.   Thank you for your referral.

## 2022-12-05 ENCOUNTER — TELEPHONE (OUTPATIENT)
Dept: FAMILY MEDICINE CLINIC | Age: 37
End: 2022-12-05

## 2022-12-05 NOTE — TELEPHONE ENCOUNTER
----- Message from Kayla Jennings sent at 12/5/2022  1:36 PM EST -----  Subject: Appointment Request    Reason for Call: New Patient/New to Provider Appointment needed: New   Patient Request Appointment    QUESTIONS    Reason for appointment request? No appointments available during search     Additional Information for Provider? pt need a call back wants a Female   new patient dr schwarz  Looking high [facility].   ---------------------------------------------------------------------------  --------------  [address]  [phone]; OK to leave message on voicemail  ---------------------------------------------------------------------------  --------------  SCRIPT ANSWERS  COVID Screen: Abimael Gonsalez

## 2022-12-05 NOTE — TELEPHONE ENCOUNTER
Patient given the names of Dr Stacie Chapa and Dr Valentino Bleak at the [facility] and the phone number since she was requesting a new female PCP at the [facility].

## 2022-12-07 NOTE — PROGRESS NOTES
[address]  [place], [facility] [address]      Date of Visit:  2022  Patient Name: Violeta Canavan   Patient :  1985     CHIEF COMPLAINT:     Violeta Canavan is a 44 y.o. female who presents today for an general visit to be evaluated for the following condition(s):  Chief Complaint   Patient presents with    New Patient    Abdominal Pain     Since Wednesday- feeling bloated - pressure       REVIEW OF SYSTEM      Review of Systems   Constitutional:  Negative for chills and fever. HENT:  Negative for congestion and sore throat. Eyes:  Negative for discharge. Respiratory:  Negative for chest tightness and shortness of breath. Cardiovascular:  Negative for chest pain and palpitations. Gastrointestinal:  Negative for abdominal pain. HISTORY OF PRESENT ILLNESS     37F PMH KRANTHI 1 s/p LEEP 2018 here for establishment of care and bloating. Having digestive symptoms (bloating) since last week. Started in the LUQ and then settled in the epigastric region. Not painful, but has pressure. Does have a little nausea but no vomiting. Is eating less because eating too much makes the pressure worse. Did have some mild reflux last night. No diarrhea or constipation. She however is having less BM, happeneing about once per day. Saturday morning lit ooked like there was blood in her stool. No tarry black stool. No history of colon cancer, but her dad had Crohn's disease. Hasn't taken anything for her symptoms at this time. After eating vegetables last Wednesday she had a cramp in her stomach. She has never had these symptoms before. She does sit all day during her job. She has to pee more often. No blood in the urine. No dysuria. She does have flank pain along her right side, associated with stress and has been going on for the last 1 year.      REVIEWED INFORMATION      No Known Allergies    Patient Active regular rhythm. Heart sounds: No murmur heard. No friction rub. No gallop. Pulmonary:      Effort: Pulmonary effort is normal. No respiratory distress. Breath sounds: Normal breath sounds. Abdominal:      General: Abdomen is flat. Bowel sounds are normal. There is no distension. Palpations: Abdomen is soft. There is no mass. Tenderness: There is abdominal tenderness (right of umbilicus). Hernia: No hernia is present. Neurological:      Mental Status: She is alert. Psychiatric:         Mood and Affect: Mood normal.       ASSESSMENT/PLAN     1. Umbilical pain  Will get labwork and CT A/P. Given her questionable history of blood in stool and family history of CD in her father, will place referral to GI for further evaluation. F/u in 2 weeks to review results. - Comprehensive Metabolic Panel; Future  - CBC with Auto Differential; Future  - Lipase; Future  - C-Reactive Protein; Future  - CT ABDOMEN PELVIS WO CONTRAST; Future  [facility]    2. Frequent urination  Will get UA to evaluate further to r/o infection.   - Urinalysis with Reflex to Culture; Future    Return in about 2 weeks (around 12/22/2022) for f/u lab results .     COMMUNICATION:       Electronically signed by Aj Becerra MD on 12/8/2022 at 3:31 PM

## 2022-12-08 ENCOUNTER — OFFICE VISIT (OUTPATIENT)
Dept: FAMILY MEDICINE CLINIC | Age: 37
End: 2022-12-08
Payer: COMMERCIAL

## 2022-12-08 ENCOUNTER — HOSPITAL ENCOUNTER (OUTPATIENT)
Age: 37
Setting detail: SPECIMEN
Discharge: HOME | End: 2022-12-08

## 2022-12-08 VITALS
SYSTOLIC BLOOD PRESSURE: 128 MMHG | HEIGHT: 69 IN | OXYGEN SATURATION: 98 % | DIASTOLIC BLOOD PRESSURE: 84 MMHG | HEART RATE: 72 BPM | BODY MASS INDEX: 38.06 KG/M2 | WEIGHT: 257 LBS

## 2022-12-08 DIAGNOSIS — R10.33 UMBILICAL PAIN: ICD-10-CM

## 2022-12-08 DIAGNOSIS — R10.33 UMBILICAL PAIN: Primary | ICD-10-CM

## 2022-12-08 DIAGNOSIS — R35.0 FREQUENT URINATION: ICD-10-CM

## 2022-12-08 LAB
ABSOLUTE EOS #: 0.16 K/UL (ref 0–0.44)
ABSOLUTE IMMATURE GRANULOCYTE: <0.03 K/UL (ref 0–0.3)
ABSOLUTE LYMPH #: 2.71 K/UL (ref 1.1–3.7)
ABSOLUTE MONO #: 0.56 K/UL (ref 0.1–1.2)
ALBUMIN SERPL-MCNC: 4.4 G/DL (ref 3.5–5.2)
ALBUMIN/GLOBULIN RATIO: 1.8 (ref 1–2.5)
ALP BLD-CCNC: 68 U/L (ref 35–104)
ALT SERPL-CCNC: 56 U/L (ref 5–33)
ANION GAP SERPL CALCULATED.3IONS-SCNC: 12 MMOL/L (ref 9–17)
AST SERPL-CCNC: 32 U/L
BASOPHILS # BLD: 1 % (ref 0–2)
BASOPHILS ABSOLUTE: 0.06 K/UL (ref 0–0.2)
BILIRUB SERPL-MCNC: 0.5 MG/DL (ref 0.3–1.2)
BILIRUBIN URINE: NEGATIVE
BUN BLDV-MCNC: 8 MG/DL (ref 6–20)
C-REACTIVE PROTEIN: <3 MG/L (ref 0–5)
CALCIUM SERPL-MCNC: 8.8 MG/DL (ref 8.6–10.4)
CHLORIDE BLD-SCNC: 102 MMOL/L (ref 98–107)
CO2: 25 MMOL/L (ref 20–31)
COLOR: YELLOW
CREAT SERPL-MCNC: 0.74 MG/DL (ref 0.5–0.9)
EOSINOPHILS RELATIVE PERCENT: 2 % (ref 1–4)
GFR SERPL CREATININE-BSD FRML MDRD: >60 ML/MIN/1.73M2
GLUCOSE BLD-MCNC: 112 MG/DL (ref 70–99)
GLUCOSE URINE: NEGATIVE
HCT VFR BLD CALC: 43.2 % (ref 36.3–47.1)
HEMOGLOBIN: 14.2 G/DL (ref 11.9–15.1)
IMMATURE GRANULOCYTES: 0 %
KETONES, URINE: NEGATIVE
LEUKOCYTE ESTERASE, URINE: ABNORMAL
LIPASE: 37 U/L (ref 13–60)
LYMPHOCYTES # BLD: 33 % (ref 24–43)
MCH RBC QN AUTO: 29 PG (ref 25.2–33.5)
MCHC RBC AUTO-ENTMCNC: 32.9 G/DL (ref 28.4–34.8)
MCV RBC AUTO: 88.2 FL (ref 82.6–102.9)
MONOCYTES # BLD: 7 % (ref 3–12)
NITRITE, URINE: NEGATIVE
NRBC AUTOMATED: 0 PER 100 WBC
PDW BLD-RTO: 12.3 % (ref 11.8–14.4)
PH UA: 5.5 (ref 5–8)
PLATELET # BLD: 309 K/UL (ref 138–453)
PMV BLD AUTO: 12.8 FL (ref 8.1–13.5)
POTASSIUM SERPL-SCNC: 3.8 MMOL/L (ref 3.7–5.3)
PROTEIN UA: ABNORMAL
RBC # BLD: 4.9 M/UL (ref 3.95–5.11)
SEG NEUTROPHILS: 57 % (ref 36–65)
SEGMENTED NEUTROPHILS ABSOLUTE COUNT: 4.82 K/UL (ref 1.5–8.1)
SODIUM BLD-SCNC: 139 MMOL/L (ref 135–144)
SPECIFIC GRAVITY UA: 1.02 (ref 1–1.03)
TOTAL PROTEIN: 6.9 G/DL (ref 6.4–8.3)
TURBIDITY: ABNORMAL
URINE HGB: NEGATIVE
UROBILINOGEN, URINE: NORMAL
WBC # BLD: 8.3 K/UL (ref 3.5–11.3)

## 2022-12-08 PROCEDURE — 99203 OFFICE O/P NEW LOW 30 MIN: CPT | Performed by: STUDENT IN AN ORGANIZED HEALTH CARE EDUCATION/TRAINING PROGRAM

## 2022-12-08 PROCEDURE — 1036F TOBACCO NON-USER: CPT | Performed by: STUDENT IN AN ORGANIZED HEALTH CARE EDUCATION/TRAINING PROGRAM

## 2022-12-08 PROCEDURE — G8427 DOCREV CUR MEDS BY ELIG CLIN: HCPCS | Performed by: STUDENT IN AN ORGANIZED HEALTH CARE EDUCATION/TRAINING PROGRAM

## 2022-12-08 PROCEDURE — G8417 CALC BMI ABV UP PARAM F/U: HCPCS | Performed by: STUDENT IN AN ORGANIZED HEALTH CARE EDUCATION/TRAINING PROGRAM

## 2022-12-08 PROCEDURE — G8484 FLU IMMUNIZE NO ADMIN: HCPCS | Performed by: STUDENT IN AN ORGANIZED HEALTH CARE EDUCATION/TRAINING PROGRAM

## 2022-12-08 ASSESSMENT — ENCOUNTER SYMPTOMS
SORE THROAT: 0
EYE DISCHARGE: 0
CHEST TIGHTNESS: 0
SHORTNESS OF BREATH: 0
ABDOMINAL PAIN: 0

## 2022-12-09 LAB
BACTERIA: ABNORMAL
EPITHELIAL CELLS UA: ABNORMAL /HPF (ref 0–5)
MUCUS: ABNORMAL
RBC UA: ABNORMAL /HPF (ref 0–2)
WBC UA: ABNORMAL /HPF (ref 0–5)

## 2022-12-16 ASSESSMENT — ENCOUNTER SYMPTOMS
CHEST TIGHTNESS: 0
ABDOMINAL PAIN: 0
EYE DISCHARGE: 0
SHORTNESS OF BREATH: 0
SORE THROAT: 0

## 2022-12-16 NOTE — PROGRESS NOTES
[address] [place] [address] [address] [facility], [address]      Date of Visit:  2022  Patient Name: Maddie Owen   Patient :  1985     CHIEF COMPLAINT:     Maddie Owen is a 44 y.o. female who presents today for an general visit to be evaluated for the following condition(s):  Chief Complaint   Patient presents with    Follow-up     Follow up on labs. REVIEW OF SYSTEM      Review of Systems   Constitutional:  Negative for chills and fever. HENT:  Negative for congestion and sore throat. Eyes:  Negative for discharge. Respiratory:  Negative for chest tightness and shortness of breath. Cardiovascular:  Negative for chest pain and palpitations. Gastrointestinal:  Negative for abdominal pain. HISTORY OF PRESENT ILLNESS     37F PMH KRANTHI 1 s/p LEEP 2018 here for establishment of care and bloating. Symptoms have resolved. AST/ALT mildly elevated. CT A/P showed hepatitis steatosis. ----    Having digestive symptoms (bloating) since last week. Started in the LUQ and then settled in the epigastric region. Not painful, but has pressure. Does have a little nausea but no vomiting. Is eating less because eating too much makes the pressure worse. Did have some mild reflux last night. No diarrhea or constipation. She however is having less BM, happeneing about once per day. Saturday morning lit ooked like there was blood in her stool. No tarry black stool. No history of colon cancer, but her dad had Crohn's disease. Hasn't taken anything for her symptoms at this time. After eating vegetables last Wednesday she had a cramp in her stomach. She has never had these symptoms before. She does sit all day during her job. She has to pee more often. No blood in the urine. No dysuria. She does have flank pain along her right side, associated with stress and has been going on for the last 1 year.      REVIEWED INFORMATION      No Known Allergies    Patient Active Problem List   Diagnosis    HSV exposure    ASCUS with positive high risk HPV cervical    KRANTHI 1    S/P LEEP 1/15/18       Past Medical History:   Diagnosis Date    Abnormal Pap smear 2008    HSIL    Bartholin cyst 2012, 2007 2007 word catheter    KRANTHI I (cervical intraepithelial neoplasia I) Mickie 20`15    HSV (herpes simplex virus) infection 2/11/2013       Past Surgical History:   Procedure Laterality Date    BUNIONECTOMY Bilateral 2000    COLPOSCOPY  06/2017    LEEP  01/15/2018    LA COLPOSCOPY,CERVIX W/ADJ VAG,W/LOOP BX N/A 1/15/2018    LEEP performed by Mindy Puente MD at [place] [address] History     Socioeconomic History    Marital status:      Spouse name: Ree George    Number of children: 1    Years of education: None    Highest education level: None   Occupational History    Occupation: [employer]   Tobacco Use    Smoking status: Never    Smokeless tobacco: Never   Vaping Use    Vaping Use: Never used   Substance and Sexual Activity    Alcohol use: Yes     Comment: once weekly    Drug use: No    Sexual activity: Not Currently     Partners: Male     Social Determinants of Health     Financial Resource Strain: Low Risk     Difficulty of Paying Living Expenses: Not hard at all   Food Insecurity: No Food Insecurity    Worried About Running Out of Food in the Last Year: Never true    [address] in the Last Year: Never true   Transportation Needs: No Transportation Needs    Lack of Transportation (Medical): No    Lack of Transportation (Non-Medical): No        Family History   Problem Relation Age of Onset    Breast Cancer Maternal Grandmother 60    Crohn's Disease Father     Heart Failure Father 64    Heart Disease Father        PHYSICAL EXAM     /80   Pulse 66   Wt 256 lb (116.1 kg)   SpO2 97%   BMI 37.80 kg/m²    Physical Exam  Constitutional:       Appearance: Normal appearance. HENT:      Head: Atraumatic.    Cardiovascular: Rate and Rhythm: Normal rate and regular rhythm. Heart sounds: No murmur heard. No friction rub. No gallop. Pulmonary:      Effort: Pulmonary effort is normal. No respiratory distress. Breath sounds: Normal breath sounds. Neurological:      Mental Status: She is alert. Psychiatric:         Mood and Affect: Mood normal.       ASSESSMENT/PLAN     1. Umbilical pain  Resolved, CT A/P showed hepatic steatosis. Advised on weight loss and low fat diet. RTC if symptoms return. Return if symptoms worsen or fail to improve.     COMMUNICATION:       Electronically signed by yRley Hewitt MD on 12/21/2022 at 7:20 AM

## 2022-12-19 ENCOUNTER — HOSPITAL ENCOUNTER (OUTPATIENT)
Dept: CT IMAGING | Facility: CLINIC | Age: 37
Discharge: HOME | End: 2022-12-21
Payer: COMMERCIAL

## 2022-12-19 DIAGNOSIS — R10.33 UMBILICAL PAIN: ICD-10-CM

## 2022-12-19 PROCEDURE — 74176 CT ABD & PELVIS W/O CONTRAST: CPT

## 2022-12-21 ENCOUNTER — OFFICE VISIT (OUTPATIENT)
Dept: FAMILY MEDICINE CLINIC | Age: 37
End: 2022-12-21
Payer: COMMERCIAL

## 2022-12-21 VITALS
WEIGHT: 256 LBS | HEART RATE: 66 BPM | BODY MASS INDEX: 37.8 KG/M2 | SYSTOLIC BLOOD PRESSURE: 138 MMHG | DIASTOLIC BLOOD PRESSURE: 80 MMHG | OXYGEN SATURATION: 97 %

## 2022-12-21 DIAGNOSIS — R10.33 UMBILICAL PAIN: Primary | ICD-10-CM

## 2022-12-21 PROCEDURE — G8484 FLU IMMUNIZE NO ADMIN: HCPCS | Performed by: STUDENT IN AN ORGANIZED HEALTH CARE EDUCATION/TRAINING PROGRAM

## 2022-12-21 PROCEDURE — G8427 DOCREV CUR MEDS BY ELIG CLIN: HCPCS | Performed by: STUDENT IN AN ORGANIZED HEALTH CARE EDUCATION/TRAINING PROGRAM

## 2022-12-21 PROCEDURE — 99213 OFFICE O/P EST LOW 20 MIN: CPT | Performed by: STUDENT IN AN ORGANIZED HEALTH CARE EDUCATION/TRAINING PROGRAM

## 2022-12-21 PROCEDURE — G8417 CALC BMI ABV UP PARAM F/U: HCPCS | Performed by: STUDENT IN AN ORGANIZED HEALTH CARE EDUCATION/TRAINING PROGRAM

## 2022-12-21 PROCEDURE — 1036F TOBACCO NON-USER: CPT | Performed by: STUDENT IN AN ORGANIZED HEALTH CARE EDUCATION/TRAINING PROGRAM

## 2023-02-23 ENCOUNTER — HOSPITAL ENCOUNTER (OUTPATIENT)
Age: 38
Setting detail: SPECIMEN
Discharge: HOME | End: 2023-02-23

## 2023-02-23 ENCOUNTER — OFFICE VISIT (OUTPATIENT)
Dept: GASTROENTEROLOGY | Age: 38
End: 2023-02-23
Payer: COMMERCIAL

## 2023-02-23 VITALS
BODY MASS INDEX: 38.8 KG/M2 | DIASTOLIC BLOOD PRESSURE: 82 MMHG | HEIGHT: 69 IN | WEIGHT: 262 LBS | SYSTOLIC BLOOD PRESSURE: 138 MMHG

## 2023-02-23 DIAGNOSIS — R10.9 ABDOMINAL PAIN, UNSPECIFIED ABDOMINAL LOCATION: ICD-10-CM

## 2023-02-23 DIAGNOSIS — R10.9 ABDOMINAL PAIN, UNSPECIFIED ABDOMINAL LOCATION: Primary | ICD-10-CM

## 2023-02-23 LAB
25(OH)D3 SERPL-MCNC: 22 NG/ML
CRP SERPL HS-MCNC: <3 MG/L (ref 0–5)
ERYTHROCYTE [SEDIMENTATION RATE] IN BLOOD BY WESTERGREN METHOD: 10 MM/HR (ref 0–20)
FOLATE SERPL-MCNC: 7 NG/ML
VIT B12 SERPL-MCNC: 383 PG/ML (ref 232–1245)

## 2023-02-23 PROCEDURE — 99202 OFFICE O/P NEW SF 15 MIN: CPT | Performed by: INTERNAL MEDICINE

## 2023-02-23 PROCEDURE — G8428 CUR MEDS NOT DOCUMENT: HCPCS | Performed by: INTERNAL MEDICINE

## 2023-02-23 PROCEDURE — G8417 CALC BMI ABV UP PARAM F/U: HCPCS | Performed by: INTERNAL MEDICINE

## 2023-02-23 PROCEDURE — G8484 FLU IMMUNIZE NO ADMIN: HCPCS | Performed by: INTERNAL MEDICINE

## 2023-02-23 PROCEDURE — 1036F TOBACCO NON-USER: CPT | Performed by: INTERNAL MEDICINE

## 2023-02-23 ASSESSMENT — ENCOUNTER SYMPTOMS
ANAL BLEEDING: 0
SORE THROAT: 0
ABDOMINAL PAIN: 0
SHORTNESS OF BREATH: 0
ABDOMINAL DISTENTION: 1
CONSTIPATION: 0
RECTAL PAIN: 0
CHOKING: 0
CHEST TIGHTNESS: 0
NAUSEA: 0
COUGH: 0
TROUBLE SWALLOWING: 0
EYES NEGATIVE: 1
VOMITING: 0
DIARRHEA: 1
ALLERGIC/IMMUNOLOGIC NEGATIVE: 1
BLOOD IN STOOL: 0

## 2023-02-23 NOTE — PROGRESS NOTES
Reason for Referral: Abdominal pain and discomfort      MD [address]  [facility],  [address]    Chief Complaint   Patient presents with    Abdominal Pain     Paternal family: Crohn's  Symptoms related to menstrual cycle. HISTORY OF PRESENT ILLNESS: Ms.Rachel Karrie Ochoa is a 43 y.o. female with a past history remarkable for obesity, irregular bowel habits, lower abdominal pain and discomfort with some concern for inflammatory bowel disease given family history, referred for evaluation of irregular bowel habits. Patient reports abdominal pain discomfort in the periumbilical region. No obvious dietary triggers. No obvious loose bowel movements, no blood in the stool. The patient has a family history of Crohn disease in the father. No recent endoscopic evaluation. Smoker: None   Drinking history: None   Illicit drugs: None   Abdominal surgeries: None   Prior Colonoscopy: None   Prior EGD: None   FH of GI issues: Father- Crohns disease. Past Medical,Family, and Social History reviewed and does contribute to the patient presentingcondition. Patient's PMH/PSH,SH,PSYCH Hx, MEDs, ALLERGIES, and ROS were all reviewed and updated in the appropriate sections.     PAST MEDICAL HISTORY:  Past Medical History:   Diagnosis Date    Abnormal Pap smear 2008    HSIL    Bartholin cyst 2012, 2007 2007 word catheter    KRANTHI I (cervical intraepithelial neoplasia I) Mickie 20`15    HSV (herpes simplex virus) infection 2/11/2013       Past Surgical History:   Procedure Laterality Date    BUNIONECTOMY Bilateral 2000    COLPOSCOPY  06/2017    LEEP  01/15/2018    ND COLPOSCOPY,CERVIX W/ADJ VAG,W/LOOP BX N/A 1/15/2018    LEEP performed by Jose David Key MD at [address] [place] [address]:    Current Outpatient Medications:     vitamin D (ERGOCALCIFEROL) 1.25 MG (63404 UT) CAPS capsule, Take 1 capsule by mouth once a week, Disp: 12 capsule, Rfl: 1    ALLERGIES:   No Known Allergies    FAMILY HISTORY:       Problem Relation Age of Onset    Breast Cancer Maternal Grandmother 61    Crohn's Disease Father     Heart Failure Father 64    Heart Disease Father          SOCIAL HISTORY:   Social History     Socioeconomic History    Marital status:      Spouse name: Sandy Alex    Number of children: 1    Years of education: Not on file    Highest education level: Not on file   Occupational History    Occupation: [employer]   Tobacco Use    Smoking status: Never    Smokeless tobacco: Never   Vaping Use    Vaping Use: Never used   Substance and Sexual Activity    Alcohol use: Yes     Comment: once weekly    Drug use: No    Sexual activity: Not Currently     Partners: Male   Other Topics Concern    Not on file   Social History Narrative    Not on file     Social Determinants of Health     Financial Resource Strain: Not on file   Food Insecurity: Not on file   Transportation Needs: Not on file   Physical Activity: Not on file   Stress: Not on file   Social Connections: Not on file   Intimate Partner Violence: Not on file   Housing Stability: Not on file         REVIEW OF SYSTEMS: A 12-point review of systems was obtained and pertinent positives and negatives were listed below. REVIEW OF SYSTEMS:     Constitutional: No fever, no chills, no lethargy, no weakness. HEENT:  No headache, otalgia, itchy eyes, nasal discharge or sore throat. Cardiac:  No chest pain, dyspnea, orthopnea or PND. Chest:   No cough, phlegm or wheezing. Abdomen:      Detailed by MA   Neuro:  No focal weakness, abnormal movements or seizure like activity. Skin:   No rashes, no itching. :   No hematuria, no pyuria, no dysuria, no flank pain. Extremities:  No swelling or joint pains. ROS was otherwise negative    Review of Systems   Constitutional:  Negative for unexpected weight change. HENT:  Negative for sore throat and trouble swallowing. Eyes: Negative.     Respiratory:  Negative for cough, choking, chest tightness and shortness of breath. Cardiovascular: Negative. Negative for chest pain. Gastrointestinal:  Positive for abdominal distention and diarrhea. Negative for abdominal pain, anal bleeding, blood in stool, constipation, nausea, rectal pain and vomiting. Endocrine: Negative. Genitourinary: Negative. Musculoskeletal: Negative. Allergic/Immunologic: Negative. Neurological:  Negative for dizziness, seizures, light-headedness, numbness and headaches. Hematological:  Does not bruise/bleed easily. Psychiatric/Behavioral:  Negative for confusion and decreased concentration. The patient is nervous/anxious. PHYSICAL EXAMINATION: Vital signs reviewed per the nursing documentation. /82 (Site: Left Upper Arm, Position: Sitting, Cuff Size: Large Adult)   Ht 5' 9\" (1.753 m)   Wt 262 lb (118.8 kg)   BMI 38.69 kg/m²   Body mass index is 38.69 kg/m². Physical Exam    Physical Exam   Constitutional: Patient is oriented to person, place, and time. Patient appears well-developed and well-nourished. HENT:   Head: Normocephalic and atraumatic. Eyes: Pupils are equal, round, and reactive to light. EOM are normal.   Neck: Normal range of motion. Neck supple. No JVD present. No tracheal deviation present. No thyromegaly present. Cardiovascular: Normal rate, regular rhythm, normal heart sounds and intact distal pulses. Pulmonary/Chest: Effort normal and breath sounds normal. No stridor. No respiratory distress. He has no wheezes. He has no rales. He exhibits no tenderness. Abdominal: Soft. Bowel sounds are normal. He exhibits no distension and no mass. There is no tenderness. There is no rebound and no guarding. No hernia. Musculoskeletal: Normal range of motion. Lymphadenopathy:    Patient has no cervical adenopathy. Neurological: Patient is alert and oriented to person, place, and time. Psychiatric: Patient has a normal mood and affect.  Patient behavior is normal. LABORATORY DATA: Reviewed  Lab Results   Component Value Date    WBC 8.3 12/08/2022    HGB 14.2 12/08/2022    HCT 43.2 12/08/2022    MCV 88.2 12/08/2022     12/08/2022     12/08/2022    K 3.8 12/08/2022     12/08/2022    CO2 25 12/08/2022    BUN 8 12/08/2022    CREATININE 0.74 12/08/2022    LABALBU 4.4 12/08/2022    BILITOT 0.5 12/08/2022    ALKPHOS 68 12/08/2022    AST 32 (H) 12/08/2022    ALT 56 (H) 12/08/2022         Lab Results   Component Value Date    RBC 4.90 12/08/2022    HGB 14.2 12/08/2022    MCV 88.2 12/08/2022    MCH 29.0 12/08/2022    MCHC 32.9 12/08/2022    RDW 12.3 12/08/2022    MPV 12.8 12/08/2022    BASOPCT 1 12/08/2022    LYMPHSABS 2.71 12/08/2022    MONOSABS 0.56 12/08/2022    NEUTROABS 4.82 12/08/2022    EOSABS 0.16 12/08/2022    BASOSABS 0.06 12/08/2022         DIAGNOSTIC TESTING:     No results found. IMPRESSION: Asmita Rae is a 43 y.o. female with a past history remarkable for obesity, irregular bowel habits, lower abdominal pain and discomfort with some concern for inflammatory bowel disease given family history, referred for evaluation of irregular bowel habits. Patient reports abdominal pain discomfort in the periumbilical region. No obvious dietary triggers. No obvious loose bowel movements, no blood in the stool. The patient has a family history of Crohn disease in the father. No recent endoscopic evaluation. Assessment  1. Abdominal pain, unspecified abdominal location        Edytamauri Jerry was seen today for abdominal pain. Diagnoses and all orders for this visit:    Abdominal pain, unspecified abdominal location-we will obtain fecal calprotectin serological studies to evaluate for the possibility of inflammatory bowel disease including Crohn's disease. We will send for vitamin deficiencies given the patient's irregular bowel habits and extraintestinal manifestations.   May consider endoscopic evaluation based on pending lab test.  Recommend dairy free trial for 72 hours.  -     Calprotectin Stool; Future  -     Celiac Disease Panel; Future  -     Sedimentation rate, automated; Future  -     C-Reactive Protein; Future  -     IBD Panel; Future  -     Vitamin B12 & Folate; Future  -     Vitamin D 25 Hydroxy; Future           RTC: 3 months.    Additional comments:          Thank you for allowing me to participate in the care of Ms. Beaulieu. For any further questions please do not hesitate to contact me.      I have reviewed and agree with the MA/LPN ROS please refer to their documentation from today's encounter on a separate note.     James Blackmon MD, MPH   Board Certified in Gastroenterology  Board Certified in Internal Medicine  Office #: [phone]          this note is created with the assistance of a speech recognition program.  While intending to generate a document that actually reflects the content of the visit, the document can still have some errors including those of syntax and sound a like substitutions which may escape proof reading.  It such instances, actual meaning can be extrapolated by contextual diversion.

## 2023-02-24 DIAGNOSIS — E55.9 VITAMIN D DEFICIENCY: Primary | ICD-10-CM

## 2023-02-24 RX ORDER — ERGOCALCIFEROL 1.25 MG/1
50000 CAPSULE ORAL WEEKLY
Qty: 12 CAPSULE | Refills: 1 | Status: SHIPPED | OUTPATIENT
Start: 2023-02-24

## 2023-02-26 LAB
GLIADIN IGA SER IA-ACNC: 0.9 U/ML
GLIADIN IGG SER IA-ACNC: <0.4 U/ML
IGA SERPL-MCNC: 156 MG/DL (ref 70–400)
TTG IGA SER IA-ACNC: <0.1 U/ML

## 2023-03-02 ENCOUNTER — HOSPITAL ENCOUNTER (OUTPATIENT)
Age: 38
Setting detail: SPECIMEN
Discharge: HOME | End: 2023-03-02

## 2023-03-02 DIAGNOSIS — R10.9 ABDOMINAL PAIN, UNSPECIFIED ABDOMINAL LOCATION: ICD-10-CM

## 2023-03-05 LAB — CALPROTECTIN, FECAL: 12 UG/G

## 2023-06-27 ENCOUNTER — TELEPHONE (OUTPATIENT)
Dept: FAMILY MEDICINE CLINIC | Age: 38
End: 2023-06-27

## 2023-07-21 NOTE — TELEPHONE ENCOUNTER
Writer reached out to Mercedes for status of this patients bill/denied charges. Will reach out again if no response in a couple of days.

## 2023-08-14 RX ORDER — ERGOCALCIFEROL 1.25 MG/1
CAPSULE ORAL
Qty: 12 CAPSULE | Refills: 1 | OUTPATIENT
Start: 2023-08-14

## 2023-09-22 ENCOUNTER — OFFICE VISIT (OUTPATIENT)
Dept: GASTROENTEROLOGY | Age: 38
End: 2023-09-22
Payer: COMMERCIAL

## 2023-09-22 VITALS
HEART RATE: 75 BPM | WEIGHT: 264.5 LBS | HEIGHT: 69 IN | DIASTOLIC BLOOD PRESSURE: 90 MMHG | SYSTOLIC BLOOD PRESSURE: 144 MMHG | BODY MASS INDEX: 39.18 KG/M2

## 2023-09-22 DIAGNOSIS — R10.13 DYSPEPSIA: Primary | ICD-10-CM

## 2023-09-22 PROCEDURE — 99213 OFFICE O/P EST LOW 20 MIN: CPT | Performed by: INTERNAL MEDICINE

## 2023-09-22 PROCEDURE — 1036F TOBACCO NON-USER: CPT | Performed by: INTERNAL MEDICINE

## 2023-09-22 PROCEDURE — G8427 DOCREV CUR MEDS BY ELIG CLIN: HCPCS | Performed by: INTERNAL MEDICINE

## 2023-09-22 PROCEDURE — G8417 CALC BMI ABV UP PARAM F/U: HCPCS | Performed by: INTERNAL MEDICINE

## 2023-09-22 ASSESSMENT — ENCOUNTER SYMPTOMS
TROUBLE SWALLOWING: 0
SORE THROAT: 0
CONSTIPATION: 0
VOMITING: 0
CHOKING: 0
SHORTNESS OF BREATH: 0
EYES NEGATIVE: 1
BLOOD IN STOOL: 0
ABDOMINAL PAIN: 0
ALLERGIC/IMMUNOLOGIC NEGATIVE: 1
NAUSEA: 0
CHEST TIGHTNESS: 0
ANAL BLEEDING: 0
COUGH: 0
ABDOMINAL DISTENTION: 1
DIARRHEA: 1
RECTAL PAIN: 0

## 2023-09-22 NOTE — PROGRESS NOTES
Reason for Referral: Abdominal pain and discomfort      No referring provider defined for this encounter. Chief Complaint   Patient presents with    Follow-up           HISTORY OF PRESENT ILLNESS: Ms.Rachel Riley Panchal is a 43 y.o. female with a past history remarkable for obesity, irregular bowel habits, lower abdominal pain and discomfort with some concern for inflammatory bowel disease given family history, referred for evaluation of irregular bowel habits. Patient reports abdominal pain discomfort in the periumbilical region. No obvious dietary triggers. No obvious loose bowel movements, no blood in the stool. The patient has a family history of Crohn disease in the father. No recent endoscopic evaluation. Smoker: None   Drinking history: None   Illicit drugs: None   Abdominal surgeries: None   Prior Colonoscopy: None   Prior EGD: None   FH of GI issues: Father- Crohns disease. Past Medical,Family, and Social History reviewed and does contribute to the patient presentingcondition. Patient's PMH/PSH,SH,PSYCH Hx, MEDs, ALLERGIES, and ROS were all reviewed and updated in the appropriate sections.     PAST MEDICAL HISTORY:  Past Medical History:   Diagnosis Date    Abnormal Pap smear 2008    HSIL    Bartholin cyst 2012, 2007 2007 word catheter    KRANTHI I (cervical intraepithelial neoplasia I) Mickie 20`15    HSV (herpes simplex virus) infection 2/11/2013       Past Surgical History:   Procedure Laterality Date    BUNIONECTOMY Bilateral 2000    COLPOSCOPY  06/2017    LEEP  01/15/2018    AR COLPOSCOPY CERVIX VAG LOOP ELTRD BX CERVIX N/A 1/15/2018    LEEP performed by Evelio Park MD at [address]:    Current Outpatient Medications:     vitamin D (ERGOCALCIFEROL) 1.25 MG (92990 UT) CAPS capsule, Take 1 capsule by mouth once a week (Patient not taking: Reported on 9/22/2023), Disp: 12 capsule, Rfl: 1    ALLERGIES:   No Known Allergies    FAMILY HISTORY:       Problem

## (undated) DEVICE — SHEET DRAPE FULL 70X100

## (undated) DEVICE — ELECTRODE ARTHSCP W20MM D12MM SHFT L11CM RND MPLR SAFE T G

## (undated) DEVICE — GLOVE ORANGE PI 7 1/2   MSG9075

## (undated) DEVICE — 1016 S-DRAPE IRRIG POUCH 10/BOX: Brand: STERI-DRAPE™

## (undated) DEVICE — PENCIL ES L3M BTTN SWCH HOLSTER W/ BLDE ELECTRD EDGE

## (undated) DEVICE — APPLICATOR FIBER TIP 16 IN CELLULOSE HD SWAB CHEVRON SCPET

## (undated) DEVICE — NEEDLE SPNL 22GA L3.5IN BLK HUB S STL REG WALL FIT STYL W/

## (undated) DEVICE — GLOVE ORANGE PI 8   MSG9080

## (undated) DEVICE — COVER OR TBL W40XL90IN ABSRB STD AND GRIPPY BK SAHARA

## (undated) DEVICE — STRAP RESTRAIN W3.5XL19IN TECLIN STRRP POS LEG DURING LITH

## (undated) DEVICE — SVMMC GYN MIN PK

## (undated) DEVICE — TUBING, SUCTION, 9/32" X 20', STRAIGHT: Brand: MEDLINE INDUSTRIES, INC.

## (undated) DEVICE — SMOKE EVACUATION TUBING WITH 7/8 IN TO 1/4 IN REDUCER: Brand: BUFFALO FILTER

## (undated) DEVICE — SOLUTION SURG PREP POV IOD 7.5% 4 OZ

## (undated) DEVICE — STRAWS ST

## (undated) DEVICE — GLOVE SURG SZ 65 THK91MIL LTX FREE SYN POLYISOPRENE

## (undated) DEVICE — INTENDED FOR TISSUE SEPARATION, AND OTHER PROCEDURES THAT REQUIRE A SHARP SURGICAL BLADE TO PUNCTURE OR CUT.: Brand: BARD-PARKER ® CARBON RIB-BACK BLADES

## (undated) DEVICE — PREP SOL PVP IODINE 4%  4 OZ/BTL

## (undated) DEVICE — ELECTRODE ES L11CM DIA5MM BALL SHFT RED DISP UTAHLOOP

## (undated) DEVICE — 1810 FOAM BLOCK NEEDLE COUNTER: Brand: DEVON